# Patient Record
Sex: FEMALE | Race: WHITE | NOT HISPANIC OR LATINO | Employment: UNEMPLOYED | URBAN - METROPOLITAN AREA
[De-identification: names, ages, dates, MRNs, and addresses within clinical notes are randomized per-mention and may not be internally consistent; named-entity substitution may affect disease eponyms.]

---

## 2017-04-12 ENCOUNTER — HISTORICAL (OUTPATIENT)
Dept: LAB | Facility: HOSPITAL | Age: 57
End: 2017-04-12

## 2017-05-08 ENCOUNTER — HISTORICAL (OUTPATIENT)
Dept: RADIOLOGY | Facility: HOSPITAL | Age: 57
End: 2017-05-08

## 2017-07-12 ENCOUNTER — HISTORICAL (OUTPATIENT)
Dept: LAB | Facility: HOSPITAL | Age: 57
End: 2017-07-12

## 2017-07-12 LAB
HEMOCCULT SP1 STL QL: NEGATIVE
HEMOCCULT SP2 STL QL: NEGATIVE

## 2017-11-06 ENCOUNTER — HISTORICAL (OUTPATIENT)
Dept: LAB | Facility: HOSPITAL | Age: 57
End: 2017-11-06

## 2017-11-06 LAB
BUN SERPL-MCNC: 14 MG/DL (ref 7–18)
CALCIUM SERPL-MCNC: 9.2 MG/DL (ref 8.5–10.1)
CHLORIDE SERPL-SCNC: 102 MMOL/L (ref 98–107)
CO2 SERPL-SCNC: 25 MMOL/L (ref 21–32)
CREAT SERPL-MCNC: 0.85 MG/DL (ref 0.55–1.02)
CREAT UR-MCNC: 13 MG/DL
EST. AVERAGE GLUCOSE BLD GHB EST-MCNC: 177 MG/DL
GLUCOSE SERPL-MCNC: 201 MG/DL (ref 74–106)
HBA1C MFR BLD: 7.8 % (ref 4.5–6.2)
MICROALBUMIN UR-MCNC: <0.5 MG/DL
MICROALBUMIN/CREAT RATIO PNL UR: <38.5 MG/GM CR (ref 0–30)
POTASSIUM SERPL-SCNC: 3.6 MMOL/L (ref 3.5–5.1)
SODIUM SERPL-SCNC: 139 MMOL/L (ref 136–145)

## 2018-02-23 ENCOUNTER — HISTORICAL (OUTPATIENT)
Dept: LAB | Facility: HOSPITAL | Age: 58
End: 2018-02-23

## 2018-02-23 LAB
EST. AVERAGE GLUCOSE BLD GHB EST-MCNC: 134 MG/DL
HBA1C MFR BLD: 6.3 % (ref 4.5–6.2)

## 2018-07-18 ENCOUNTER — HISTORICAL (OUTPATIENT)
Dept: RADIOLOGY | Facility: HOSPITAL | Age: 58
End: 2018-07-18

## 2018-07-18 LAB
APPEARANCE, UA: CLEAR
BACTERIA SPEC CULT: NORMAL
BILIRUB UR QL STRIP: NEGATIVE
CHOLEST SERPL-MCNC: 220 MG/DL (ref 50–200)
CHOLEST/HDLC SERPL: 5 {RATIO} (ref 0–5)
COLOR UR: YELLOW
CREAT UR-MCNC: 97 MG/DL
GLUCOSE (UA): NEGATIVE
HDLC SERPL-MCNC: 48 MG/DL (ref 35–60)
HGB UR QL STRIP: NEGATIVE
KETONES UR QL STRIP: NEGATIVE
LDLC SERPL CALC-MCNC: 127 MG/DL (ref 50–140)
LEUKOCYTE ESTERASE UR QL STRIP: NEGATIVE
MICROALBUMIN UR-MCNC: 0.8 MG/DL
MICROALBUMIN/CREAT RATIO PNL UR: 8.2 MG/GM CR (ref 0–30)
NITRITE UR QL STRIP: NEGATIVE
PH UR STRIP: 5.5 [PH] (ref 4.6–8)
PROT UR QL STRIP: NEGATIVE
RBC #/AREA URNS HPF: NORMAL /HPF
SP GR UR STRIP: 1.02 (ref 1–1.03)
SQUAMOUS EPITHELIAL, UA: NORMAL
TRIGL SERPL-MCNC: 223 MG/DL (ref 30–150)
UROBILINOGEN UR STRIP-ACNC: 0.2
VLDLC SERPL CALC-MCNC: 45 MG/DL
WBC #/AREA URNS HPF: NORMAL /HPF

## 2018-11-28 ENCOUNTER — HISTORICAL (OUTPATIENT)
Dept: LAB | Facility: HOSPITAL | Age: 58
End: 2018-11-28

## 2018-11-28 LAB
BUN SERPL-MCNC: 11 MG/DL (ref 7–18)
CALCIUM SERPL-MCNC: 8.6 MG/DL (ref 8.5–10.1)
CHLORIDE SERPL-SCNC: 108 MMOL/L (ref 98–107)
CO2 SERPL-SCNC: 23.9 MMOL/L (ref 21–32)
CREAT SERPL-MCNC: 0.74 MG/DL (ref 0.55–1.02)
CREAT/UREA NIT SERPL: 15
GLUCOSE SERPL-MCNC: 155 MG/DL (ref 74–106)
POTASSIUM SERPL-SCNC: 4 MMOL/L (ref 3.5–5.1)
SODIUM SERPL-SCNC: 141 MMOL/L (ref 136–145)

## 2018-12-03 ENCOUNTER — HISTORICAL (OUTPATIENT)
Dept: LAB | Facility: HOSPITAL | Age: 58
End: 2018-12-03

## 2018-12-03 LAB
HEMOCCULT SP1 STL QL: NEGATIVE
HEMOCCULT SP2 STL QL: NEGATIVE

## 2019-06-26 ENCOUNTER — HISTORICAL (OUTPATIENT)
Dept: LAB | Facility: HOSPITAL | Age: 59
End: 2019-06-26

## 2019-06-26 LAB
ABS NEUT (OLG): 5.11
ALBUMIN SERPL-MCNC: 3.7 GM/DL (ref 3.4–5)
ALBUMIN/GLOB SERPL: 1 RATIO (ref 1.1–2)
ALP SERPL-CCNC: 74 UNIT/L (ref 46–116)
ALT SERPL-CCNC: 28 UNIT/L (ref 12–78)
APPEARANCE, UA: CLEAR
AST SERPL-CCNC: 9 UNIT/L (ref 10–37)
BACTERIA SPEC CULT: ABNORMAL
BASOPHILS # BLD AUTO: 0.04 X10(3)/MCL
BASOPHILS NFR BLD AUTO: 0.4 %
BILIRUB SERPL-MCNC: 0.2 MG/DL (ref 0.2–1)
BILIRUB UR QL STRIP: NEGATIVE
BILIRUBIN DIRECT+TOT PNL SERPL-MCNC: 0.07 MG/DL (ref 0–0.2)
BILIRUBIN DIRECT+TOT PNL SERPL-MCNC: 0.13 MG/DL
BUN SERPL-MCNC: 16 MG/DL (ref 7–18)
CALCIUM SERPL-MCNC: 9.3 MG/DL (ref 8.5–10.1)
CHLORIDE SERPL-SCNC: 106 MMOL/L (ref 98–107)
CHOLEST SERPL-MCNC: 352 MG/DL (ref 50–200)
CHOLEST/HDLC SERPL: 9 {RATIO} (ref 0–5)
CO2 SERPL-SCNC: 23.8 MMOL/L (ref 21–32)
COLOR UR: YELLOW
CREAT SERPL-MCNC: 0.78 MG/DL (ref 0.55–1.02)
CREAT UR-MCNC: 133 MG/DL
EOSINOPHIL # BLD AUTO: 0.28 X10(3)/MCL
EOSINOPHIL NFR BLD AUTO: 3.1 %
ERYTHROCYTE [DISTWIDTH] IN BLOOD BY AUTOMATED COUNT: 13 %
EST. AVERAGE GLUCOSE BLD GHB EST-MCNC: 186 MG/DL
GLOBULIN SER-MCNC: 3.7 GM/DL (ref 2.4–3.5)
GLUCOSE (UA): NEGATIVE
GLUCOSE SERPL-MCNC: 182 MG/DL (ref 74–106)
HBA1C MFR BLD: 8.1 % (ref 4.5–6.2)
HCT VFR BLD AUTO: 36 % (ref 34–46)
HDLC SERPL-MCNC: 41 MG/DL (ref 35–60)
HGB BLD-MCNC: 11.6 GM/DL (ref 11.3–15.4)
HGB UR QL STRIP: NEGATIVE
IMM GRANULOCYTES # BLD AUTO: 0.03 10*3/UL (ref 0–0.1)
IMM GRANULOCYTES NFR BLD AUTO: 0.3 % (ref 0–1)
KETONES UR QL STRIP: NEGATIVE
LDLC SERPL CALC-MCNC: ABNORMAL MG/DL (ref 50–140)
LEUKOCYTE ESTERASE UR QL STRIP: ABNORMAL
LYMPHOCYTES # BLD AUTO: 3.06 X10(3)/MCL
LYMPHOCYTES NFR BLD AUTO: 33.4 %
MCH RBC QN AUTO: 28.6 PG (ref 27–33)
MCHC RBC AUTO-ENTMCNC: 32.2 GM/DL (ref 32–35)
MCV RBC AUTO: 88.7 FL (ref 81–97)
MICROALBUMIN UR-MCNC: 1.8 MG/DL
MICROALBUMIN/CREAT RATIO PNL UR: 13.5 MG/GM CR (ref 0–30)
MONOCYTES # BLD AUTO: 0.65 X10(3)/MCL
MONOCYTES NFR BLD AUTO: 7.1 %
NEUTROPHILS # BLD AUTO: 5.11 X10(3)/MCL
NEUTROPHILS NFR BLD AUTO: 55.7 %
NITRITE UR QL STRIP: NEGATIVE
PH UR STRIP: 5.5 [PH] (ref 4.6–8)
PLATELET # BLD AUTO: 341 X10(3)/MCL (ref 151–368)
PMV BLD AUTO: 10 FL
POTASSIUM SERPL-SCNC: 3.9 MMOL/L (ref 3.5–5.1)
PROT SERPL-MCNC: 7.4 GM/DL (ref 6.4–8.2)
PROT UR QL STRIP: NEGATIVE
RBC # BLD AUTO: 4.06 X10(6)/MCL (ref 3.9–5)
RBC #/AREA URNS HPF: ABNORMAL /[HPF]
SODIUM SERPL-SCNC: 142 MMOL/L (ref 136–145)
SP GR UR STRIP: 1.02 (ref 1–1.03)
SQUAMOUS EPITHELIAL, UA: ABNORMAL
TRIGL SERPL-MCNC: 444 MG/DL (ref 30–150)
UROBILINOGEN UR STRIP-ACNC: 0.2
VLDLC SERPL CALC-MCNC: 89 MG/DL
WBC # SPEC AUTO: 9.17 X10(3)/MCL (ref 3.4–9.2)
WBC #/AREA URNS HPF: ABNORMAL /HPF

## 2019-06-28 LAB — FINAL CULTURE: NO GROWTH

## 2019-09-13 ENCOUNTER — HISTORICAL (OUTPATIENT)
Dept: LAB | Facility: HOSPITAL | Age: 59
End: 2019-09-13

## 2019-09-13 LAB
CHOLEST SERPL-MCNC: 323 MG/DL (ref 50–200)
CHOLEST/HDLC SERPL: 7 {RATIO} (ref 0–5)
EST. AVERAGE GLUCOSE BLD GHB EST-MCNC: 166 MG/DL
HBA1C MFR BLD: 7.4 % (ref 4.5–6.2)
HDLC SERPL-MCNC: 44 MG/DL (ref 35–60)
LDLC SERPL CALC-MCNC: 212 MG/DL (ref 50–140)
TRIGL SERPL-MCNC: 335 MG/DL (ref 30–150)
VLDLC SERPL CALC-MCNC: 67 MG/DL

## 2019-11-04 ENCOUNTER — HISTORICAL (OUTPATIENT)
Dept: RADIOLOGY | Facility: HOSPITAL | Age: 59
End: 2019-11-04

## 2020-03-01 LAB — HEMOCCULT SP1 STL QL: NEGATIVE

## 2020-03-02 LAB — HEMOCCULT SP2 STL QL: NEGATIVE

## 2020-03-03 ENCOUNTER — HISTORICAL (OUTPATIENT)
Dept: LAB | Facility: HOSPITAL | Age: 60
End: 2020-03-03

## 2020-06-24 ENCOUNTER — HISTORICAL (OUTPATIENT)
Dept: RADIOLOGY | Facility: HOSPITAL | Age: 60
End: 2020-06-24

## 2020-06-24 LAB
ABS NEUT (OLG): 5.25
ALBUMIN SERPL-MCNC: 4.2 GM/DL (ref 3.5–5)
ALBUMIN/GLOB SERPL: 1.2 RATIO (ref 1.1–2)
ALP SERPL-CCNC: 85 UNIT/L (ref 40–150)
ALT SERPL-CCNC: 21 UNIT/L (ref 0–55)
APPEARANCE, UA: ABNORMAL
AST SERPL-CCNC: 12 UNIT/L (ref 5–34)
BACTERIA SPEC CULT: ABNORMAL
BASOPHILS # BLD AUTO: 0.04 X10(3)/MCL
BASOPHILS NFR BLD AUTO: 0.4 %
BILIRUB SERPL-MCNC: 0.3 MG/DL
BILIRUB UR QL STRIP: NEGATIVE
BILIRUBIN DIRECT+TOT PNL SERPL-MCNC: 0.1 MG/DL (ref 0–0.5)
BILIRUBIN DIRECT+TOT PNL SERPL-MCNC: 0.2 MG/DL
BUN SERPL-MCNC: 29 MG/DL (ref 9.8–20.1)
CALCIUM SERPL-MCNC: 9.5 MG/DL (ref 8.4–10.2)
CHLORIDE SERPL-SCNC: 105 MMOL/L (ref 98–107)
CHOLEST SERPL-MCNC: 261 MG/DL
CHOLEST/HDLC SERPL: 6 {RATIO} (ref 0–5)
CO2 SERPL-SCNC: 23 MEQ/L (ref 22–29)
COLOR UR: YELLOW
CREAT SERPL-MCNC: 1 MG/DL (ref 0.55–1.02)
CREAT UR-MCNC: 193 MG/DL (ref 45–106)
EOSINOPHIL # BLD AUTO: 0.36 X10(3)/MCL
EOSINOPHIL NFR BLD AUTO: 3.6 %
ERYTHROCYTE [DISTWIDTH] IN BLOOD BY AUTOMATED COUNT: 13 %
EST. AVERAGE GLUCOSE BLD GHB EST-MCNC: 332 MG/DL
GLOBULIN SER-MCNC: 3.6 GM/DL (ref 2.4–3.5)
GLUCOSE (UA): ABNORMAL
GLUCOSE SERPL-MCNC: 314 MG/DL (ref 74–100)
HBA1C MFR BLD: 13.2 % (ref 4–6)
HCT VFR BLD AUTO: 39.9 % (ref 34–46)
HDLC SERPL-MCNC: 45 MG/DL (ref 35–60)
HGB BLD-MCNC: 13.1 GM/DL (ref 11.3–15.4)
HGB UR QL STRIP: ABNORMAL
IMM GRANULOCYTES # BLD AUTO: 0.02 10*3/UL (ref 0–0.1)
IMM GRANULOCYTES NFR BLD AUTO: 0.2 % (ref 0–1)
KETONES UR QL STRIP: NEGATIVE
LDLC SERPL CALC-MCNC: 139 MG/DL (ref 50–140)
LEUKOCYTE ESTERASE UR QL STRIP: ABNORMAL
LYMPHOCYTES # BLD AUTO: 3.65 X10(3)/MCL
LYMPHOCYTES NFR BLD AUTO: 36.4 %
MCH RBC QN AUTO: 28.5 PG (ref 27–33)
MCHC RBC AUTO-ENTMCNC: 32.8 GM/DL (ref 32–35)
MCV RBC AUTO: 86.7 FL (ref 81–97)
MICROALBUMIN UR-MCNC: 39.6 UG/ML
MICROALBUMIN/CREAT RATIO PNL UR: 20.5 MG/GM CR (ref 0–30)
MONOCYTES # BLD AUTO: 0.7 X10(3)/MCL
MONOCYTES NFR BLD AUTO: 7 %
NEUTROPHILS # BLD AUTO: 5.25 X10(3)/MCL
NEUTROPHILS NFR BLD AUTO: 52.4 %
NITRITE UR QL STRIP: NEGATIVE
PH UR STRIP: 5.5 [PH] (ref 4.6–8)
PLATELET # BLD AUTO: 339 X10(3)/MCL (ref 140–450)
PMV BLD AUTO: 10 FL
POTASSIUM SERPL-SCNC: 4.3 MMOL/L (ref 3.5–5.1)
PROT SERPL-MCNC: 7.8 GM/DL (ref 6.4–8.3)
PROT UR QL STRIP: ABNORMAL
RBC # BLD AUTO: 4.6 X10(6)/MCL (ref 3.9–5)
RBC #/AREA URNS HPF: ABNORMAL /[HPF]
SODIUM SERPL-SCNC: 138 MMOL/L (ref 136–145)
SP GR UR STRIP: 1.02 (ref 1–1.03)
SQUAMOUS EPITHELIAL, UA: ABNORMAL
TRIGL SERPL-MCNC: 383 MG/DL (ref 37–140)
UROBILINOGEN UR STRIP-ACNC: 0.2
VLDLC SERPL CALC-MCNC: 77 MG/DL
WBC # SPEC AUTO: 10.02 X10(3)/MCL (ref 3.4–9.2)
WBC #/AREA URNS HPF: ABNORMAL /HPF

## 2020-06-25 LAB — FINAL CULTURE: NORMAL

## 2020-11-19 ENCOUNTER — HISTORICAL (OUTPATIENT)
Dept: LAB | Facility: HOSPITAL | Age: 60
End: 2020-11-19

## 2020-11-19 LAB
EST. AVERAGE GLUCOSE BLD GHB EST-MCNC: 352 MG/DL
HBA1C MFR BLD: 13.9 % (ref 4–6)

## 2021-07-01 ENCOUNTER — HISTORICAL (OUTPATIENT)
Dept: RADIOLOGY | Facility: HOSPITAL | Age: 61
End: 2021-07-01

## 2021-07-19 ENCOUNTER — HISTORICAL (OUTPATIENT)
Dept: LAB | Facility: HOSPITAL | Age: 61
End: 2021-07-19

## 2021-07-19 LAB
ABS NEUT (OLG): 6.88
ALBUMIN SERPL-MCNC: 4.3 GM/DL (ref 3.5–5)
ALBUMIN/GLOB SERPL: 1.5 RATIO (ref 1.1–2)
ALP SERPL-CCNC: 45 UNIT/L (ref 40–150)
ALT SERPL-CCNC: 33 UNIT/L (ref 0–55)
AST SERPL-CCNC: 19 UNIT/L (ref 5–34)
BASOPHILS # BLD AUTO: 0.03 X10(3)/MCL
BASOPHILS NFR BLD AUTO: 0.3 %
BILIRUB SERPL-MCNC: 0.5 MG/DL
BILIRUBIN DIRECT+TOT PNL SERPL-MCNC: 0.2 MG/DL (ref 0–0.5)
BILIRUBIN DIRECT+TOT PNL SERPL-MCNC: 0.3 MG/DL
BUN SERPL-MCNC: 32 MG/DL (ref 9.8–20.1)
CALCIUM SERPL-MCNC: 10 MG/DL (ref 8.4–10.2)
CHLORIDE SERPL-SCNC: 105 MMOL/L (ref 98–107)
CHOLEST SERPL-MCNC: 131 MG/DL
CHOLEST/HDLC SERPL: 2 {RATIO} (ref 0–5)
CO2 SERPL-SCNC: 24 MEQ/L (ref 22–29)
CREAT SERPL-MCNC: 0.77 MG/DL (ref 0.55–1.02)
CREAT UR-MCNC: 87 MG/DL (ref 45–106)
EOSINOPHIL # BLD AUTO: 0.04 X10(3)/MCL
EOSINOPHIL NFR BLD AUTO: 0.4 %
ERYTHROCYTE [DISTWIDTH] IN BLOOD BY AUTOMATED COUNT: 14 %
GLOBULIN SER-MCNC: 2.9 GM/DL (ref 2.4–3.5)
GLUCOSE SERPL-MCNC: 99 MG/DL (ref 74–100)
HCT VFR BLD AUTO: 36.9 % (ref 34–46)
HDLC SERPL-MCNC: 53 MG/DL (ref 35–60)
HGB BLD-MCNC: 11.8 GM/DL (ref 11.3–15.4)
IMM GRANULOCYTES # BLD AUTO: 0.02 10*3/UL (ref 0–0.1)
IMM GRANULOCYTES NFR BLD AUTO: 0.2 % (ref 0–1)
LDLC SERPL CALC-MCNC: 48 MG/DL (ref 50–140)
LYMPHOCYTES # BLD AUTO: 3.31 X10(3)/MCL
LYMPHOCYTES NFR BLD AUTO: 30.4 %
MCH RBC QN AUTO: 29 PG (ref 27–33)
MCHC RBC AUTO-ENTMCNC: 32 GM/DL (ref 32–35)
MCV RBC AUTO: 90.7 FL (ref 81–97)
MICROALBUMIN UR-MCNC: 42.3 UG/ML
MICROALBUMIN/CREAT RATIO PNL UR: 48.6 MG/GM CR (ref 0–30)
MONOCYTES # BLD AUTO: 0.62 X10(3)/MCL
MONOCYTES NFR BLD AUTO: 5.7 %
NEUTROPHILS # BLD AUTO: 6.88 X10(3)/MCL
NEUTROPHILS NFR BLD AUTO: 63 %
PLATELET # BLD AUTO: 462 X10(3)/MCL (ref 140–450)
PMV BLD AUTO: 10 FL
POTASSIUM SERPL-SCNC: 3.8 MMOL/L (ref 3.5–5.1)
PROT SERPL-MCNC: 7.2 GM/DL (ref 6.4–8.3)
RBC # BLD AUTO: 4.07 X10(6)/MCL (ref 3.9–5)
SODIUM SERPL-SCNC: 140 MMOL/L (ref 136–145)
TRIGL SERPL-MCNC: 150 MG/DL (ref 37–140)
VLDLC SERPL CALC-MCNC: 30 MG/DL
WBC # SPEC AUTO: 10.9 X10(3)/MCL (ref 3.4–9.2)

## 2021-07-27 LAB — HEMOCCULT SP1 STL QL: NEGATIVE

## 2021-07-28 ENCOUNTER — HISTORICAL (OUTPATIENT)
Dept: ADMINISTRATIVE | Facility: HOSPITAL | Age: 61
End: 2021-07-28

## 2021-07-28 LAB — HEMOCCULT SP2 STL QL: NEGATIVE

## 2021-08-02 ENCOUNTER — HISTORICAL (OUTPATIENT)
Dept: RADIOLOGY | Facility: HOSPITAL | Age: 61
End: 2021-08-02

## 2022-04-01 LAB
HBA1C MFR BLD: 5.7 % (ref 4–6)
HBA1C MFR BLD: 5.7 % (ref 4–6)

## 2022-04-10 ENCOUNTER — HISTORICAL (OUTPATIENT)
Dept: ADMINISTRATIVE | Facility: HOSPITAL | Age: 62
End: 2022-04-10

## 2022-04-28 VITALS
WEIGHT: 76.5 LBS | OXYGEN SATURATION: 96 % | HEIGHT: 56 IN | SYSTOLIC BLOOD PRESSURE: 98 MMHG | BODY MASS INDEX: 17.21 KG/M2 | DIASTOLIC BLOOD PRESSURE: 60 MMHG

## 2022-07-20 ENCOUNTER — DOCUMENTATION ONLY (OUTPATIENT)
Dept: FAMILY MEDICINE | Facility: CLINIC | Age: 62
End: 2022-07-20

## 2022-08-09 ENCOUNTER — DOCUMENTATION ONLY (OUTPATIENT)
Dept: ADMINISTRATIVE | Facility: HOSPITAL | Age: 62
End: 2022-08-09

## 2022-08-11 DIAGNOSIS — Z11.59 NEED FOR HEPATITIS C SCREENING TEST: ICD-10-CM

## 2022-08-11 DIAGNOSIS — Z13.6 SCREENING FOR ISCHEMIC HEART DISEASE: ICD-10-CM

## 2022-08-11 DIAGNOSIS — E13.9 DIABETES 1.5, MANAGED AS TYPE 2: ICD-10-CM

## 2022-08-11 DIAGNOSIS — Z11.4 ENCOUNTER FOR SCREENING FOR HIV: ICD-10-CM

## 2022-08-11 DIAGNOSIS — Z00.00 WELLNESS EXAMINATION: Primary | ICD-10-CM

## 2022-09-08 ENCOUNTER — OFFICE VISIT (OUTPATIENT)
Dept: FAMILY MEDICINE | Facility: CLINIC | Age: 62
End: 2022-09-08
Payer: MEDICARE

## 2022-09-08 VITALS
WEIGHT: 87.81 LBS | TEMPERATURE: 97 F | SYSTOLIC BLOOD PRESSURE: 98 MMHG | DIASTOLIC BLOOD PRESSURE: 62 MMHG | BODY MASS INDEX: 20.32 KG/M2 | OXYGEN SATURATION: 99 % | HEART RATE: 70 BPM | HEIGHT: 55 IN

## 2022-09-08 DIAGNOSIS — Z12.11 COLON CANCER SCREENING: ICD-10-CM

## 2022-09-08 DIAGNOSIS — Z12.31 SCREENING MAMMOGRAM, ENCOUNTER FOR: ICD-10-CM

## 2022-09-08 DIAGNOSIS — Z01.419 ENCOUNTER FOR ANNUAL ROUTINE GYNECOLOGICAL EXAMINATION: ICD-10-CM

## 2022-09-08 DIAGNOSIS — Z00.00 WELLNESS EXAMINATION: Primary | ICD-10-CM

## 2022-09-08 PROBLEM — F32.A DEPRESSION: Status: ACTIVE | Noted: 2022-09-08

## 2022-09-08 PROBLEM — I10 HYPERTENSION: Status: ACTIVE | Noted: 2022-09-08

## 2022-09-08 PROBLEM — E78.5 HYPERLIPIDEMIA: Status: ACTIVE | Noted: 2022-09-08

## 2022-09-08 PROBLEM — E11.9 DIABETES MELLITUS: Status: ACTIVE | Noted: 2022-09-08

## 2022-09-08 PROCEDURE — G0439 PPPS, SUBSEQ VISIT: HCPCS | Mod: ,,, | Performed by: FAMILY MEDICINE

## 2022-09-08 PROCEDURE — G0439 PR MEDICARE ANNUAL WELLNESS SUBSEQUENT VISIT: ICD-10-PCS | Mod: ,,, | Performed by: FAMILY MEDICINE

## 2022-09-08 RX ORDER — PIOGLITAZONEHYDROCHLORIDE 30 MG/1
30 TABLET ORAL DAILY
COMMUNITY
Start: 2022-08-19

## 2022-09-08 RX ORDER — METFORMIN HYDROCHLORIDE 1000 MG/1
1000 TABLET ORAL 2 TIMES DAILY
COMMUNITY
Start: 2022-07-30

## 2022-09-08 RX ORDER — ROSUVASTATIN CALCIUM 40 MG/1
40 TABLET, COATED ORAL DAILY
COMMUNITY
Start: 2022-08-19

## 2022-09-08 RX ORDER — QUINAPRIL HYDROCHLORIDE AND HYDROCHLOROTHIAZIDE 20; 12.5 MG/1; MG/1
1 TABLET, FILM COATED ORAL DAILY
COMMUNITY
Start: 2022-08-19 | End: 2022-11-03 | Stop reason: SDUPTHER

## 2022-09-08 NOTE — PROGRESS NOTES
"  Patient ID: [id number]     Chief Complaint: Annual Exam      HPI:     Lucretia Bean is a 62 y.o. female here today for a Medicare Wellness.       Opioid Screening: Patient medication list reviewed, patient is not taking prescription opioids. Patient is not using additional opioids than prescribed. Patient is not at low risk of substance abuse based on this opioid use history.       ----------------------------  Depression  Essential (primary) hypertension  Mixed hyperlipidemia  Type 2 diabetes mellitus without complications     History reviewed. No pertinent surgical history.    Review of patient's allergies indicates:  No Known Allergies    No outpatient medications have been marked as taking for the 9/8/22 encounter (Office Visit) with Herb Delgado MD.       Social History     Socioeconomic History    Marital status: Single   Tobacco Use    Smoking status: Never    Smokeless tobacco: Never   Substance and Sexual Activity    Alcohol use: Never    Drug use: Never    Sexual activity: Not Currently        History reviewed. No pertinent family history.     Patient Care Team:  Herb Delgado MD as PCP - General (Family Medicine)  Cyril Gabriel MD as Consulting Physician (Endocrinology)       Subjective:     Diabetes  - tolerating medication (no side-effects)  - DXT: 's  - watching diet    Review of Systems   Constitutional: Negative.    HENT: Negative.     Respiratory: Negative.     Cardiovascular: Negative.    Gastrointestinal: Negative.    Genitourinary: Negative.    Musculoskeletal: Negative.    Neurological: Negative.    Endo/Heme/Allergies: Negative.    Psychiatric/Behavioral: Negative.         Patient Reported Health Risk Assessment       Objective:     BP 98/62 (BP Location: Left arm)   Pulse 70   Temp 97.4 °F (36.3 °C)   Ht 4' 7" (1.397 m)   Wt 39.8 kg (87 lb 12.8 oz)   SpO2 99%   BMI 20.41 kg/m²     Physical Exam  Constitutional:       Appearance: Normal appearance.   Cardiovascular:      " Rate and Rhythm: Normal rate and regular rhythm.      Heart sounds: Normal heart sounds.   Pulmonary:      Effort: Pulmonary effort is normal.      Breath sounds: Normal breath sounds.   Abdominal:      General: Abdomen is flat. Bowel sounds are normal.      Palpations: Abdomen is soft.   Neurological:      Mental Status: She is alert.   Psychiatric:         Mood and Affect: Mood normal.         Behavior: Behavior normal.         Thought Content: Thought content normal.         Judgment: Judgment normal.         No flowsheet data found.  Fall Risk Assessment - Outpatient 9/8/2022   Mobility Status Ambulatory   Number of falls 0   Identified as fall risk 0              Assessment/Plan:     1. Wellness examination  Lab work results pending; get copy of recent lab work at [facility]  Continue current medication  Continue diet/exercise  Advanced directive discussed with patient  Return to clinic with any concerns    2. Screening mammogram, encounter for  Schedule mammogram  3. Colon cancer screening  Refer patient to Dr. Cantor for colonoscopy  4. Encounter for annual routine gynecological examination   Refer patient to Dr. Mcclure for annual Pap      Medicare Annual Wellness and Personalized Prevention Plan:   Fall Risk + Home Safety + Hearing Impairment + Depression Screen + Opioid and Substance Abuse Screening + Cognitive Impairment Screen + Health Risk Assessment all reviewed.     Health Maintenance Topics with due status: Not Due       Topic Last Completion Date    Lipid Panel 07/19/2021      The patient's Health Maintenance was reviewed and the following appears to be due at this time:   Health Maintenance Due   Topic Date Due    Hepatitis C Screening  Never done    Cervical Cancer Screening  Never done    HIV Screening  Never done    TETANUS VACCINE  Never done    Colorectal Cancer Screening  Never done    Shingles Vaccine (1 of 2) Never done    COVID-19 Vaccine (3 - Booster for Pfizer series) 02/08/2022    Mammogram   07/01/2022    Influenza Vaccine (1) 09/01/2022       Advance Care Planning   I attest to discussing Advance Care Planning with patient and/or family member.  Education was provided including the importance of the Health Care Power of , Advance Directives, and/or [place] documentation.  The patient expressed understanding to the importance of this information and discussion.         Follow up in about 6 months (around 3/8/2023). In addition to their scheduled follow up, the patient has also been instructed to follow up on as needed basis.

## 2022-09-14 ENCOUNTER — TELEPHONE (OUTPATIENT)
Dept: FAMILY MEDICINE | Facility: CLINIC | Age: 62
End: 2022-09-14
Payer: MEDICARE

## 2022-09-14 NOTE — TELEPHONE ENCOUNTER
----- Message from Herb Delgado MD sent at 9/13/2022 10:38 AM CDT -----  Please notify patient with results

## 2022-09-30 LAB
HPV16+18+H RISK 12 DNA CVX-IMP: NEGATIVE
PAP RECOMMENDATION EXT: NORMAL
PAP SMEAR: NORMAL

## 2022-11-03 DIAGNOSIS — I10 PRIMARY HYPERTENSION: Primary | ICD-10-CM

## 2022-11-03 RX ORDER — QUINAPRIL HYDROCHLORIDE AND HYDROCHLOROTHIAZIDE 20; 12.5 MG/1; MG/1
1 TABLET, FILM COATED ORAL DAILY
Qty: 90 TABLET | Refills: 1 | Status: SHIPPED | OUTPATIENT
Start: 2022-11-03 | End: 2023-04-03

## 2022-11-28 LAB — CRC RECOMMENDATION EXT: NORMAL

## 2022-12-05 DIAGNOSIS — I10 HYPERTENSION, UNSPECIFIED TYPE: Primary | ICD-10-CM

## 2022-12-05 RX ORDER — LISINOPRIL AND HYDROCHLOROTHIAZIDE 12.5; 2 MG/1; MG/1
1 TABLET ORAL DAILY
Qty: 90 TABLET | Refills: 3 | Status: SHIPPED | OUTPATIENT
Start: 2022-12-05 | End: 2023-12-04

## 2022-12-07 ENCOUNTER — DOCUMENTATION ONLY (OUTPATIENT)
Dept: FAMILY MEDICINE | Facility: CLINIC | Age: 62
End: 2022-12-07
Payer: MEDICARE

## 2023-04-03 ENCOUNTER — OFFICE VISIT (OUTPATIENT)
Dept: FAMILY MEDICINE | Facility: CLINIC | Age: 63
End: 2023-04-03
Payer: MEDICARE

## 2023-04-03 VITALS
SYSTOLIC BLOOD PRESSURE: 92 MMHG | TEMPERATURE: 97 F | RESPIRATION RATE: 16 BRPM | WEIGHT: 88.38 LBS | DIASTOLIC BLOOD PRESSURE: 60 MMHG | HEIGHT: 55 IN | HEART RATE: 63 BPM | OXYGEN SATURATION: 100 % | BODY MASS INDEX: 20.45 KG/M2

## 2023-04-03 DIAGNOSIS — I10 HYPERTENSION, UNSPECIFIED TYPE: Primary | ICD-10-CM

## 2023-04-03 DIAGNOSIS — E11.42 TYPE 2 DIABETES MELLITUS WITH DIABETIC POLYNEUROPATHY, WITHOUT LONG-TERM CURRENT USE OF INSULIN: ICD-10-CM

## 2023-04-03 DIAGNOSIS — E78.5 HYPERLIPIDEMIA, UNSPECIFIED HYPERLIPIDEMIA TYPE: ICD-10-CM

## 2023-04-03 PROCEDURE — 99214 PR OFFICE/OUTPT VISIT, EST, LEVL IV, 30-39 MIN: ICD-10-PCS | Mod: ,,, | Performed by: FAMILY MEDICINE

## 2023-04-03 PROCEDURE — 99214 OFFICE O/P EST MOD 30 MIN: CPT | Mod: ,,, | Performed by: FAMILY MEDICINE

## 2023-04-03 NOTE — PROGRESS NOTES
"Subjective:       Patient ID: Lucretia Bean is a 62 y.o. female.    Chief Complaint: 6 month follow up and hyperlipidemia per labs from Dr. Gabriel       Routine    Diabetes  - tolerating medication (no side-effects)  - DXT: 's  - watching diet  - patient reports no neuropathy at this time    Hypertension  - tolerating medication (no side effects)  - no headaches  - patient without any complaints    Hyperlipidemia  - tolerating medication (no side effects)   - no myalgia  - watching diet    Review of Systems   Constitutional: Negative.    Respiratory: Negative.     Cardiovascular: Negative.    Gastrointestinal: Negative.    Psychiatric/Behavioral: Negative.           Objective:      BP 92/60 (BP Location: Left arm, Patient Position: Sitting, BP Method: Large (Manual))   Pulse 63   Temp 96.9 °F (36.1 °C)   Resp 16   Ht 4' 7" (1.397 m)   Wt 40.1 kg (88 lb 6.4 oz)   SpO2 100%   BMI 20.55 kg/m²    Physical Exam  Constitutional:       Appearance: Normal appearance.   Cardiovascular:      Rate and Rhythm: Normal rate and regular rhythm.      Heart sounds: Normal heart sounds.   Pulmonary:      Effort: Pulmonary effort is normal.      Breath sounds: Normal breath sounds.   Neurological:      Mental Status: She is alert.   Psychiatric:         Mood and Affect: Mood normal.         Behavior: Behavior normal.         Thought Content: Thought content normal.         Judgment: Judgment normal.             Assessment:       Problem List Items Addressed This Visit          Cardiac/Vascular    Hyperlipidemia    Hypertension - Primary       Endocrine    Type 2 diabetes mellitus with diabetic polyneuropathy, without long-term current use of insulin    Current Assessment & Plan     Hemoglobin A1c controlled   Continue current medication               Plan:   1. Hypertension, unspecified type  Continue current Rx medication  Monitor BP  Return to clinic with any concerns    2. Type 2 diabetes mellitus with diabetic " polyneuropathy, without long-term current use of insulin  Assessment & Plan:  Hemoglobin A1c controlled   Continue current medication  Lab work at next visit    3. Hyperlipidemia, unspecified hyperlipidemia type  Continue current medication   FLP prior to next visit

## 2023-09-11 ENCOUNTER — DOCUMENTATION ONLY (OUTPATIENT)
Dept: ADMINISTRATIVE | Facility: HOSPITAL | Age: 63
End: 2023-09-11
Payer: MEDICARE

## 2023-09-20 DIAGNOSIS — Z11.59 NEED FOR HEPATITIS C SCREENING TEST: ICD-10-CM

## 2023-09-20 DIAGNOSIS — Z00.00 WELLNESS EXAMINATION: ICD-10-CM

## 2023-09-20 DIAGNOSIS — E11.9 TYPE 2 DIABETES MELLITUS WITHOUT COMPLICATION, WITHOUT LONG-TERM CURRENT USE OF INSULIN: ICD-10-CM

## 2023-09-20 DIAGNOSIS — Z13.6 SCREENING FOR ISCHEMIC HEART DISEASE: ICD-10-CM

## 2023-09-20 DIAGNOSIS — I10 HYPERTENSION, UNSPECIFIED TYPE: ICD-10-CM

## 2023-09-20 DIAGNOSIS — Z11.4 ENCOUNTER FOR HIV (HUMAN IMMUNODEFICIENCY VIRUS) TEST: Primary | ICD-10-CM

## 2023-09-29 ENCOUNTER — DOCUMENTATION ONLY (OUTPATIENT)
Dept: FAMILY MEDICINE | Facility: CLINIC | Age: 63
End: 2023-09-29
Payer: MEDICARE

## 2023-09-29 ENCOUNTER — LAB VISIT (OUTPATIENT)
Dept: LAB | Facility: HOSPITAL | Age: 63
End: 2023-09-29
Attending: FAMILY MEDICINE
Payer: MEDICARE

## 2023-09-29 ENCOUNTER — DOCUMENTATION ONLY (OUTPATIENT)
Dept: ADMINISTRATIVE | Facility: HOSPITAL | Age: 63
End: 2023-09-29
Payer: MEDICARE

## 2023-09-29 DIAGNOSIS — Z11.4 ENCOUNTER FOR HIV (HUMAN IMMUNODEFICIENCY VIRUS) TEST: ICD-10-CM

## 2023-09-29 DIAGNOSIS — Z13.6 SCREENING FOR ISCHEMIC HEART DISEASE: ICD-10-CM

## 2023-09-29 DIAGNOSIS — Z00.00 WELLNESS EXAMINATION: ICD-10-CM

## 2023-09-29 DIAGNOSIS — I10 HYPERTENSION, UNSPECIFIED TYPE: ICD-10-CM

## 2023-09-29 DIAGNOSIS — E11.9 TYPE 2 DIABETES MELLITUS WITHOUT COMPLICATION, WITHOUT LONG-TERM CURRENT USE OF INSULIN: ICD-10-CM

## 2023-09-29 DIAGNOSIS — Z11.59 NEED FOR HEPATITIS C SCREENING TEST: ICD-10-CM

## 2023-09-29 LAB
ALBUMIN SERPL-MCNC: 4.1 G/DL (ref 3.4–4.8)
ALBUMIN/GLOB SERPL: 1.4 RATIO (ref 1.1–2)
ALP SERPL-CCNC: 47 UNIT/L (ref 40–150)
ALT SERPL-CCNC: 20 UNIT/L (ref 0–55)
AST SERPL-CCNC: 16 UNIT/L (ref 5–34)
BASOPHILS # BLD AUTO: 0.04 X10(3)/MCL
BASOPHILS NFR BLD AUTO: 0.4 %
BILIRUB SERPL-MCNC: 0.2 MG/DL
BUN SERPL-MCNC: 30.1 MG/DL (ref 9.8–20.1)
CALCIUM SERPL-MCNC: 10.2 MG/DL (ref 8.4–10.2)
CHLORIDE SERPL-SCNC: 107 MMOL/L (ref 98–107)
CHOLEST SERPL-MCNC: 150 MG/DL
CHOLEST/HDLC SERPL: 2 {RATIO} (ref 0–5)
CO2 SERPL-SCNC: 26 MMOL/L (ref 23–31)
CREAT SERPL-MCNC: 0.82 MG/DL (ref 0.55–1.02)
CREAT UR-MCNC: 75.4 MG/DL (ref 45–106)
EOSINOPHIL # BLD AUTO: 0.09 X10(3)/MCL (ref 0–0.9)
EOSINOPHIL NFR BLD AUTO: 0.8 %
ERYTHROCYTE [DISTWIDTH] IN BLOOD BY AUTOMATED COUNT: 14.5 % (ref 11.5–17)
EST. AVERAGE GLUCOSE BLD GHB EST-MCNC: 119.8 MG/DL
GFR SERPLBLD CREATININE-BSD FMLA CKD-EPI: >60 MLS/MIN/1.73/M2
GLOBULIN SER-MCNC: 3 GM/DL (ref 2.4–3.5)
GLUCOSE SERPL-MCNC: 109 MG/DL (ref 82–115)
HBA1C MFR BLD: 5.8 %
HCT VFR BLD AUTO: 32.7 % (ref 37–47)
HDLC SERPL-MCNC: 62 MG/DL (ref 35–60)
HGB BLD-MCNC: 9.9 G/DL (ref 12–16)
HIV 1+2 AB+HIV1 P24 AG SERPL QL IA: NONREACTIVE
IMM GRANULOCYTES # BLD AUTO: 0.07 X10(3)/MCL (ref 0–0.04)
IMM GRANULOCYTES NFR BLD AUTO: 0.6 %
LDLC SERPL CALC-MCNC: 62 MG/DL (ref 50–140)
LYMPHOCYTES # BLD AUTO: 2.47 X10(3)/MCL (ref 0.6–4.6)
LYMPHOCYTES NFR BLD AUTO: 22.1 %
MCH RBC QN AUTO: 28.7 PG (ref 27–31)
MCHC RBC AUTO-ENTMCNC: 30.3 G/DL (ref 33–36)
MCV RBC AUTO: 94.8 FL (ref 80–94)
MICROALBUMIN UR-MCNC: 22.5 UG/ML
MICROALBUMIN/CREAT RATIO PNL UR: 29.8 MG/GM CR (ref 0–30)
MONOCYTES # BLD AUTO: 0.59 X10(3)/MCL (ref 0.1–1.3)
MONOCYTES NFR BLD AUTO: 5.3 %
NEUTROPHILS # BLD AUTO: 7.93 X10(3)/MCL (ref 2.1–9.2)
NEUTROPHILS NFR BLD AUTO: 70.8 %
NRBC BLD AUTO-RTO: 0 %
PAP RECOMMENDATION EXT: NORMAL
PLATELET # BLD AUTO: 413 X10(3)/MCL (ref 130–400)
PMV BLD AUTO: 9.4 FL (ref 7.4–10.4)
POTASSIUM SERPL-SCNC: 4.7 MMOL/L (ref 3.5–5.1)
PROT SERPL-MCNC: 7.1 GM/DL (ref 5.8–7.6)
RBC # BLD AUTO: 3.45 X10(6)/MCL (ref 4.2–5.4)
SODIUM SERPL-SCNC: 143 MMOL/L (ref 136–145)
TRIGL SERPL-MCNC: 128 MG/DL (ref 37–140)
VLDLC SERPL CALC-MCNC: 26 MG/DL
WBC # SPEC AUTO: 11.19 X10(3)/MCL (ref 4.5–11.5)

## 2023-09-29 PROCEDURE — 86803 HEPATITIS C AB TEST: CPT

## 2023-09-29 PROCEDURE — 80053 COMPREHEN METABOLIC PANEL: CPT

## 2023-09-29 PROCEDURE — 83036 HEMOGLOBIN GLYCOSYLATED A1C: CPT

## 2023-09-29 PROCEDURE — 80061 LIPID PANEL: CPT

## 2023-09-29 PROCEDURE — 82043 UR ALBUMIN QUANTITATIVE: CPT

## 2023-09-29 PROCEDURE — 87389 HIV-1 AG W/HIV-1&-2 AB AG IA: CPT

## 2023-09-29 PROCEDURE — 85025 COMPLETE CBC W/AUTO DIFF WBC: CPT

## 2023-09-29 PROCEDURE — 36415 COLL VENOUS BLD VENIPUNCTURE: CPT

## 2023-09-30 LAB — HCV AB SERPL QL IA: NONREACTIVE

## 2023-10-02 ENCOUNTER — OFFICE VISIT (OUTPATIENT)
Dept: FAMILY MEDICINE | Facility: CLINIC | Age: 63
End: 2023-10-02
Payer: MEDICARE

## 2023-10-02 ENCOUNTER — CLINICAL SUPPORT (OUTPATIENT)
Dept: FAMILY MEDICINE | Facility: CLINIC | Age: 63
End: 2023-10-02
Attending: FAMILY MEDICINE
Payer: MEDICARE

## 2023-10-02 VITALS
BODY MASS INDEX: 19.9 KG/M2 | WEIGHT: 86 LBS | DIASTOLIC BLOOD PRESSURE: 66 MMHG | HEIGHT: 55 IN | SYSTOLIC BLOOD PRESSURE: 110 MMHG | TEMPERATURE: 96 F | HEART RATE: 74 BPM | OXYGEN SATURATION: 98 % | RESPIRATION RATE: 18 BRPM

## 2023-10-02 DIAGNOSIS — Z00.00 WELLNESS EXAMINATION: Primary | ICD-10-CM

## 2023-10-02 DIAGNOSIS — Z12.31 SCREENING MAMMOGRAM, ENCOUNTER FOR: ICD-10-CM

## 2023-10-02 DIAGNOSIS — Z01.419 ENCOUNTER FOR ANNUAL ROUTINE GYNECOLOGICAL EXAMINATION: ICD-10-CM

## 2023-10-02 DIAGNOSIS — E11.42 TYPE 2 DIABETES MELLITUS WITH DIABETIC POLYNEUROPATHY, WITHOUT LONG-TERM CURRENT USE OF INSULIN: Primary | ICD-10-CM

## 2023-10-02 DIAGNOSIS — R41.3 MEMORY LOSS: ICD-10-CM

## 2023-10-02 DIAGNOSIS — E11.42 TYPE 2 DIABETES MELLITUS WITH DIABETIC POLYNEUROPATHY, WITHOUT LONG-TERM CURRENT USE OF INSULIN: ICD-10-CM

## 2023-10-02 PROCEDURE — 92228 IMG RTA DETC/MNTR DS PHY/QHP: CPT | Mod: TC,,, | Performed by: FAMILY MEDICINE

## 2023-10-02 PROCEDURE — 92228 PR REMOTE IMAGE RETINA, MONITOR/MANAGE ACTIVE DISEASE: ICD-10-PCS | Mod: TC,,, | Performed by: FAMILY MEDICINE

## 2023-10-02 PROCEDURE — 92228 DIABETIC EYE SCREENING PHOTO: ICD-10-PCS | Mod: 26,,, | Performed by: OPHTHALMOLOGY

## 2023-10-02 PROCEDURE — 92228 IMG RTA DETC/MNTR DS PHY/QHP: CPT | Mod: 26,,, | Performed by: OPHTHALMOLOGY

## 2023-10-02 PROCEDURE — G0439 PR MEDICARE ANNUAL WELLNESS SUBSEQUENT VISIT: ICD-10-PCS | Mod: ,,, | Performed by: FAMILY MEDICINE

## 2023-10-02 PROCEDURE — G0439 PPPS, SUBSEQ VISIT: HCPCS | Mod: ,,, | Performed by: FAMILY MEDICINE

## 2023-10-02 NOTE — PROGRESS NOTES
Patient ID: [id number]     Chief Complaint: WELLNESS      HPI:     Lucretia Bean is a 63 y.o. female here today for a Medicare Wellness.       Opioid Screening: Patient medication list reviewed, patient is not taking prescription opioids. Patient is not using additional opioids than prescribed. Patient is not at low risk of substance abuse based on this opioid use history.       ----------------------------  Depression  Essential (primary) hypertension  Mixed hyperlipidemia  Type 2 diabetes mellitus without complications     Past Surgical History:   Procedure Laterality Date    TUBAL LIGATION         Review of patient's allergies indicates:  No Known Allergies    Outpatient Medications Marked as Taking for the 10/2/23 encounter (Office Visit) with Herb Delgado MD   Medication Sig Dispense Refill    lisinopriL-hydrochlorothiazide (PRINZIDE,ZESTORETIC) 20-12.5 mg per tablet Take 1 tablet by mouth once daily. 90 tablet 3    metFORMIN (GLUCOPHAGE) 1000 MG tablet Take 1,000 mg by mouth 2 (two) times daily.      pioglitazone (ACTOS) 30 MG tablet Take 30 mg by mouth once daily.      rosuvastatin (CRESTOR) 40 MG Tab Take 40 mg by mouth once daily.         Social History     Socioeconomic History    Marital status: Single   Tobacco Use    Smoking status: Never    Smokeless tobacco: Never   Substance and Sexual Activity    Alcohol use: Never    Drug use: Never    Sexual activity: Not Currently   Social History Narrative    ** Merged History Encounter **          Social Determinants of Health     Financial Resource Strain: Low Risk  (10/2/2023)    Overall Financial Resource Strain (CARDIA)     Difficulty of Paying Living Expenses: Not hard at all   Food Insecurity: No Food Insecurity (10/2/2023)    Hunger Vital Sign     Worried About Running Out of Food in the Last Year: Never true     Ran Out of Food in the Last Year: Never true   Transportation Needs: No Transportation Needs (10/2/2023)    PRAPARE - Transportation      Lack of Transportation (Medical): No     Lack of Transportation (Non-Medical): No   Physical Activity: Sufficiently Active (10/2/2023)    Exercise Vital Sign     Days of Exercise per Week: 5 days     Minutes of Exercise per Session: 30 min   Stress: No Stress Concern Present (10/2/2023)    [employer] - Occupational Stress Questionnaire     Feeling of Stress : Not at all   Social Connections: Moderately Integrated (10/2/2023)    Social Connection and Isolation Panel [NHANES]     Frequency of Communication with Friends and Family: Three times a week     Frequency of Social Gatherings with Friends and Family: Three times a week     Attends [employer] Services: 1 to 4 times per year     Active Member of Clubs or Organizations: No     Attends Club or Organization Meetings: 1 to 4 times per year     Marital Status: Never    Housing Stability: Unknown (10/2/2023)    Housing Stability Vital Sign     Unable to Pay for Housing in the Last Year: No     Unstable Housing in the Last Year: No        History reviewed. No pertinent family history.     Patient Care Team:  Herb Delgado MD as PCP - General (Family Medicine)  Herb Delgado MD Baquet, Todd, MD as Consulting Physician (Endocrinology)  Manuel Cantor MD as Consulting Physician (Gastroenterology)  Sid Culp MD as Consulting Physician (Obstetrics and Gynecology)       Subjective:     Diabetes  - tolerating medication (no side-effects)  - DXT: 110-120's  - watching diet    Review of Systems   Constitutional: Negative.    Respiratory: Negative.     Cardiovascular: Negative.    Gastrointestinal: Negative.    Genitourinary:         Scheduled for howard/pap on 12/2023 with GYN   Neurological:         Family reports memory loss   Psychiatric/Behavioral: Negative.           Patient Reported Health Risk Assessment  Are you male or female?: Female  During the past four weeks, how much have you been bothered by emotional problems  such as feeling anxious, depressed, irritable, sad, or downhearted and blue?: Not at all  During the past five weeks, has your physical and/or emotional health limited your social activities with family, friends, neighbors, or groups?: Not at all  During the past four weeks, how much bodily pain have you generally had?: No pain  During the past four weeks, was someone available to help if you needed and wanted help?: Yes, as much as I wanted  During the past four weeks, what was the hardest physical activity you could do for at least two minutes?: Light  Can you get to places out of walking distance without help?  (For example, can you travel alone on buses or taxis, or drive your own car?): Yes  Can you go shopping for groceries or clothes without someone's help?: No  Can you prepare your own meals?: No  Can you do your own housework without help?: No  Because of any health problems, do you need the help of another person with your personal care needs such as eating, bathing, dressing, or getting around the house?: Yes  Can you handle your own money without help?: No  During the past four weeks, how would you rate your health in general?: Good  How have things been going for you during the past four weeks?: Good and bad parts about equal  Are you having difficulties driving your car?: Not applicable, I do not use a car  Do you always fasten your seat belt when you are in a car?: Yes, usually  How often in the past four weeks have you been bothered by falling or dizzy when standing up?: Never  How often in the past four weeks have you been bothered by sexual problems?: Never  How often in the past four weeks have you been bothered by trouble eating well?: Never  How often in the past four weeks have you been bothered by teeth or denture problems?: Never  How often in the past four weeks have you been bothered with problems using the telephone?: Never  How often in the past four weeks have you been bothered by  "tiredness or fatigue?: Never  Have you fallen two or more times in the past year?: No  Are you afraid of falling?: No  Are you a smoker?: No  During the past four weeks, how many drinks of wine, beer, or other alcoholic beverages did you have?: No alcohol at all  Do you exercise for about 20 minutes three or more days a week?: Yes, most of the time  Have you been given any information to help you with hazards in your house that might hurt you?: Yes  Have you been given any information to help you with keeping track of your medications?: Yes  How often do you have trouble taking medicines the way you've been told to take them?: I always take them as prescribed  How confident are you that you can control and manage most of your health problems?: Very confident  What is your race? (Check all that apply.):     Objective:     /66 (BP Location: Left arm, Patient Position: Sitting, BP Method: Large (Manual))   Pulse 74   Temp 96.4 °F (35.8 °C)   Resp 18   Ht 4' 7" (1.397 m)   Wt 39 kg (86 lb)   SpO2 98%   BMI 19.99 kg/m²     Physical Exam  Constitutional:       Appearance: Normal appearance.   Cardiovascular:      Rate and Rhythm: Normal rate and regular rhythm.   Pulmonary:      Effort: Pulmonary effort is normal.      Breath sounds: Normal breath sounds.   Abdominal:      General: Abdomen is flat. Bowel sounds are normal.      Palpations: Abdomen is soft.   Neurological:      Mental Status: She is alert.   Psychiatric:         Mood and Affect: Mood normal.         Behavior: Behavior normal.         Thought Content: Thought content normal.         Judgment: Judgment normal.       Recent Results (from the past 504 hour(s))   HIV 1/2 Ag/Ab (4th Gen)    Collection Time: 09/29/23 10:12 AM   Result Value Ref Range    HIV Nonreactive Nonreactive   Hepatitis C Antibody    Collection Time: 09/29/23 10:12 AM   Result Value Ref Range    Hep C Ab Interp Nonreactive Nonreactive   Comprehensive Metabolic Panel    " Collection Time: 09/29/23 10:12 AM   Result Value Ref Range    Sodium Level 143 136 - 145 mmol/L    Potassium Level 4.7 3.5 - 5.1 mmol/L    Chloride 107 98 - 107 mmol/L    Carbon Dioxide 26 23 - 31 mmol/L    Glucose Level 109 82 - 115 mg/dL    Blood Urea Nitrogen 30.1 (H) 9.8 - 20.1 mg/dL    Creatinine 0.82 0.55 - 1.02 mg/dL    Calcium Level Total 10.2 8.4 - 10.2 mg/dL    Protein Total 7.1 5.8 - 7.6 gm/dL    Albumin Level 4.1 3.4 - 4.8 g/dL    Globulin 3.0 2.4 - 3.5 gm/dL    Albumin/Globulin Ratio 1.4 1.1 - 2.0 ratio    Bilirubin Total 0.2 <=1.5 mg/dL    Alkaline Phosphatase 47 40 - 150 unit/L    Alanine Aminotransferase 20 0 - 55 unit/L    Aspartate Aminotransferase 16 5 - 34 unit/L    eGFR >60 mls/min/1.73/m2   Lipid Panel    Collection Time: 09/29/23 10:12 AM   Result Value Ref Range    Cholesterol Total 150 <=200 mg/dL    HDL Cholesterol 62 (H) 35 - 60 mg/dL    Triglyceride 128 37 - 140 mg/dL    Cholesterol/HDL Ratio 2 0 - 5    Very Low Density Lipoprotein 26     LDL Cholesterol 62.00 50.00 - 140.00 mg/dL   Hemoglobin A1C    Collection Time: 09/29/23 10:12 AM   Result Value Ref Range    Hemoglobin A1c 5.8 <=7.0 %    Estimated Average Glucose 119.8 mg/dL   Microalbumin/Creatinine Ratio, Urine    Collection Time: 09/29/23 10:12 AM   Result Value Ref Range    Urine Microalbumin 22.5 <=30.0 ug/ml    Urine Creatinine 75.4 45.0 - 106.0 mg/dL    Microalbumin Creatinine Ratio 29.8 0.0 - 30.0 mg/gm Cr   CBC with Differential    Collection Time: 09/29/23 10:12 AM   Result Value Ref Range    WBC 11.19 4.50 - 11.50 x10(3)/mcL    RBC 3.45 (L) 4.20 - 5.40 x10(6)/mcL    Hgb 9.9 (L) 12.0 - 16.0 g/dL    Hct 32.7 (L) 37.0 - 47.0 %    MCV 94.8 (H) 80.0 - 94.0 fL    MCH 28.7 27.0 - 31.0 pg    MCHC 30.3 (L) 33.0 - 36.0 g/dL    RDW 14.5 11.5 - 17.0 %    Platelet 413 (H) 130 - 400 x10(3)/mcL    MPV 9.4 7.4 - 10.4 fL    Neut % 70.8 %    Lymph % 22.1 %    Mono % 5.3 %    Eos % 0.8 %    Basophil % 0.4 %    Lymph # 2.47 0.6 - 4.6  "x10(3)/mcL    Neut # 7.93 2.1 - 9.2 x10(3)/mcL    Mono # 0.59 0.1 - 1.3 x10(3)/mcL    Eos # 0.09 0 - 0.9 x10(3)/mcL    Baso # 0.04 <=0.2 x10(3)/mcL    IG# 0.07 (H) 0 - 0.04 x10(3)/mcL    IG% 0.6 %    NRBC% 0.0 %               No data to display                  10/2/2023    10:00 AM 4/3/2023     9:45 AM 9/8/2022    10:15 AM   Fall Risk Assessment - Outpatient   Mobility Status Ambulatory Ambulatory Ambulatory   Number of falls 0 0 0   Identified as fall risk False False False           Depression Screening  Over the past two weeks, has the patient felt down, depressed, or hopeless?: No  Over the past two weeks, has the patient felt little interest or pleasure in doing things?: No  Functional Ability/Safety Screening  Was the patient's timed Up & Go test unsteady or longer than 30 seconds?: No  Does the patient need help with phone, transportation, shopping, preparing meals, housework, laundry, meds, or managing money?: No  Does the patient's home have rugs in the hallway, lack grab bars in the bathroom, lack handrails on the stairs or have poor lighting?: No  Have you noticed any hearing difficulties?: No  A "Yes" response to any of the above questions should trigger further investigation.: N  Cognitive Function (Assessed through direct observation with due consideration of information obtained by way of patient reports and/or concerns raised by family, friends, caretakers, or others)    Does the patient repeat questions/statements in the same day?: No  Does the patient have trouble remembering the date, year, and time?: No  Does the patient have difficulty managing finances?: No  Does the patient have a decreased sense of direction?: No  A "Yes" response to any of the above questions could indicate mild cognitive impairment and should trigger further investigation.: N  Assessment/Plan:     1. Wellness examination  Lab work reviewed with patient  Continue current medication  Continue diet/exercise  Advanced " directive discussed with patient  Return to clinic with any concerns    Advance Care Planning     Date: 10/02/2023    Living Will  During this visit, I engaged the patient and family  in the voluntary advance care planning process.  The patient and I reviewed the role for advance directives and their purpose in directing future healthcare if the patient's unable to speak for him/herself.  At this point in time, the patient does have full decision-making capacity.  We discussed different extreme health states that she could experience, and reviewed what kind of medical care she would want in those situations.  The patient and family communicated that if she were comatose and had little chance of a meaningful recovery, she is not sure if she would want machines/life-sustaining treatments used.  Family to discuss further with patient and notify clinic of wishes.  I spent a total of 5 minutes engaging the patient in this advance care planning discussion.     2. Memory loss  -     Ambulatory referral/consult to Neurology; Future; Expected date: 10/09/2023  Refer patient to Dr. Venegas     3. Screening mammogram, encounter for  Scheduled for Lachelle/Pap with Dr. Jaramillo on 12/12/2023    4. Encounter for annual routine gynecological examination           Medicare Annual Wellness and Personalized Prevention Plan:   Fall Risk + Home Safety + Hearing Impairment + Depression Screen + Opioid and Substance Abuse Screening + Cognitive Impairment Screen + Health Risk Assessment all reviewed.     Health Maintenance Topics with due status: Not Due       Topic Last Completion Date    Colorectal Cancer Screening 11/28/2022    Diabetes Urine Screening 09/29/2023    Hemoglobin A1c (Prediabetes) 09/29/2023    Lipid Panel 09/29/2023    Hemoglobin A1c 09/29/2023      The patient's Health Maintenance was reviewed and the following appears to be due at this time:   Health Maintenance Due   Topic Date Due    TETANUS VACCINE  Never done    Shingles  Vaccine (1 of 2) Never done    COVID-19 Vaccine (3 - Pfizer series) 11/03/2021    Influenza Vaccine (1) 09/01/2023    Eye Exam  Never done    Cervical Cancer Screening  09/27/2023       Advance Care Planning   I attest to discussing Advance Care Planning with patient and/or family member.  Education was provided including the importance of the Health Care Power of , Advance Directives, and/or [place] documentation.  The patient expressed understanding to the importance of this information and discussion.         Follow up in about 6 months (around 4/2/2024). In addition to their scheduled follow up, the patient has also been instructed to follow up on as needed basis.

## 2023-10-03 ENCOUNTER — TELEPHONE (OUTPATIENT)
Dept: FAMILY MEDICINE | Facility: CLINIC | Age: 63
End: 2023-10-03
Payer: MEDICARE

## 2023-10-03 NOTE — TELEPHONE ENCOUNTER
----- Message from Herb Delgado MD sent at 10/2/2023  4:33 PM CDT -----  Diabetic Eye Results: Please inform patient of NORMAL Diabetic Eye Screen, indicating no signs of damage to the retina from diabetes. Repeat screening in 1 year.

## 2023-12-04 DIAGNOSIS — I10 HYPERTENSION, UNSPECIFIED TYPE: ICD-10-CM

## 2023-12-04 RX ORDER — LISINOPRIL AND HYDROCHLOROTHIAZIDE 12.5; 2 MG/1; MG/1
1 TABLET ORAL
Qty: 90 TABLET | Refills: 3 | Status: SHIPPED | OUTPATIENT
Start: 2023-12-04

## 2023-12-12 ENCOUNTER — OFFICE VISIT (OUTPATIENT)
Dept: NEUROLOGY | Facility: CLINIC | Age: 63
End: 2023-12-12
Payer: MEDICARE

## 2023-12-12 VITALS
SYSTOLIC BLOOD PRESSURE: 112 MMHG | HEIGHT: 55 IN | WEIGHT: 87 LBS | DIASTOLIC BLOOD PRESSURE: 72 MMHG | BODY MASS INDEX: 20.13 KG/M2

## 2023-12-12 DIAGNOSIS — R41.3 MEMORY LOSS: ICD-10-CM

## 2023-12-12 DIAGNOSIS — F09 COGNITIVE DYSFUNCTION: ICD-10-CM

## 2023-12-12 DIAGNOSIS — I10 HYPERTENSION, UNSPECIFIED TYPE: ICD-10-CM

## 2023-12-12 DIAGNOSIS — G47.33 OSA (OBSTRUCTIVE SLEEP APNEA): ICD-10-CM

## 2023-12-12 DIAGNOSIS — R41.3 OTHER AMNESIA: Primary | ICD-10-CM

## 2023-12-12 DIAGNOSIS — Z87.820 H/O TRAUMATIC BRAIN INJURY: ICD-10-CM

## 2023-12-12 PROCEDURE — 99999 PR PBB SHADOW E&M-EST. PATIENT-LVL III: CPT | Mod: PBBFAC,,, | Performed by: NURSE PRACTITIONER

## 2023-12-12 PROCEDURE — 99213 OFFICE O/P EST LOW 20 MIN: CPT | Mod: PBBFAC | Performed by: NURSE PRACTITIONER

## 2023-12-12 PROCEDURE — 99214 PR OFFICE/OUTPT VISIT, EST, LEVL IV, 30-39 MIN: ICD-10-PCS | Mod: S$PBB,,, | Performed by: NURSE PRACTITIONER

## 2023-12-12 PROCEDURE — 99999 PR PBB SHADOW E&M-EST. PATIENT-LVL III: ICD-10-PCS | Mod: PBBFAC,,, | Performed by: NURSE PRACTITIONER

## 2023-12-12 PROCEDURE — 99214 OFFICE O/P EST MOD 30 MIN: CPT | Mod: S$PBB,,, | Performed by: NURSE PRACTITIONER

## 2023-12-12 RX ORDER — MULTIVITAMIN
1 TABLET ORAL DAILY
COMMUNITY
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
## 2023-12-13 ENCOUNTER — PROCEDURE VISIT (OUTPATIENT)
Dept: SLEEP MEDICINE | Facility: HOSPITAL | Age: 63
End: 2023-12-13
Attending: NURSE PRACTITIONER
Payer: MEDICARE

## 2023-12-13 DIAGNOSIS — Z87.820 H/O TRAUMATIC BRAIN INJURY: Primary | ICD-10-CM

## 2023-12-13 DIAGNOSIS — E11.42 TYPE 2 DIABETES MELLITUS WITH DIABETIC POLYNEUROPATHY, WITHOUT LONG-TERM CURRENT USE OF INSULIN: ICD-10-CM

## 2023-12-13 DIAGNOSIS — I10 HYPERTENSION, UNSPECIFIED TYPE: ICD-10-CM

## 2023-12-13 DIAGNOSIS — I10 PRIMARY HYPERTENSION: ICD-10-CM

## 2023-12-13 DIAGNOSIS — F09 COGNITIVE DYSFUNCTION: ICD-10-CM

## 2023-12-13 DIAGNOSIS — G47.33 OSA (OBSTRUCTIVE SLEEP APNEA): ICD-10-CM

## 2023-12-13 DIAGNOSIS — R41.3 MEMORY LOSS: ICD-10-CM

## 2023-12-13 PROCEDURE — 95806 SLEEP STUDY UNATT&RESP EFFT: CPT | Mod: 26,,, | Performed by: SPECIALIST

## 2023-12-13 PROCEDURE — 95806 SLEEP STUDY UNATT&RESP EFFT: CPT

## 2023-12-13 PROCEDURE — 95806 PR SLEEP STUDY, UNATTENDED, SIMUL RECORD HR/O2 SAT/RESP FLOW/RESP EFFT: ICD-10-PCS | Mod: 26,,, | Performed by: SPECIALIST

## 2024-01-04 ENCOUNTER — OFFICE VISIT (OUTPATIENT)
Dept: NEUROLOGY | Facility: CLINIC | Age: 64
End: 2024-01-04
Payer: MEDICARE

## 2024-01-04 DIAGNOSIS — F09 COGNITIVE DYSFUNCTION: Primary | ICD-10-CM

## 2024-01-04 DIAGNOSIS — E11.42 TYPE 2 DIABETES MELLITUS WITH DIABETIC POLYNEUROPATHY, WITHOUT LONG-TERM CURRENT USE OF INSULIN: ICD-10-CM

## 2024-01-04 DIAGNOSIS — G47.33 OSA (OBSTRUCTIVE SLEEP APNEA): ICD-10-CM

## 2024-01-04 PROCEDURE — 99213 OFFICE O/P EST LOW 20 MIN: CPT | Mod: 95,,, | Performed by: NURSE PRACTITIONER

## 2024-01-04 NOTE — PROGRESS NOTES
Virtual Visit Note    Subjective:          Patient ID: Lucretia Bean is a 63 y.o. female.    Chief Complaint: HST results; cog complaints     HPI:           The patient location is: home   Visit type: audiovisual    HST:  AHI 10  Wilmer 84%  <88% 3 minutes       This is a telemedicine note. After obtaining verbal consent/patient identification using name and , patient was treated using real time audio/video, according to [facility] protocols. IWes NP [distant provider], conducted the visit from location identified below. The patient participated in the visit at a non-[facility] location selected by the patient (or patients representative), identified below. I am licensed in the state where the patient stated they are located. The patient (or patients representative) stated that they understood and accepted the privacy and security risks to their information at their location.    Distant provider was located at [facility]    Each patient to whom he or she provides medical services by telemedicine is:  (1) informed of the relationship between the physician and patient and the respective role of any other health care provider with respect to management of the patient; and (2) notified that he or she may decline to receive medical services by telemedicine and may withdraw from such care at any time.          Past Medical History:   Diagnosis Date    Depression     Essential (primary) hypertension     Mixed hyperlipidemia     Type 2 diabetes mellitus without complications        Past Surgical History:   Procedure Laterality Date    TUBAL LIGATION         History reviewed. No pertinent family history.    Social History     Socioeconomic History    Marital status: Single   Tobacco Use    Smoking status: Never    Smokeless tobacco: Never   Substance and Sexual Activity    Alcohol use: Never    Drug use: Never    Sexual activity: Not Currently   Social History Narrative    ** Merged  History Encounter **          Social Determinants of Health     Financial Resource Strain: Low Risk  (10/2/2023)    Overall Financial Resource Strain (CARDIA)     Difficulty of Paying Living Expenses: Not hard at all   Food Insecurity: No Food Insecurity (10/2/2023)    Hunger Vital Sign     Worried About Running Out of Food in the Last Year: Never true     Ran Out of Food in the Last Year: Never true   Transportation Needs: No Transportation Needs (10/2/2023)    PRAPARE - Transportation     Lack of Transportation (Medical): No     Lack of Transportation (Non-Medical): No   Physical Activity: Sufficiently Active (10/2/2023)    Exercise Vital Sign     Days of Exercise per Week: 5 days     Minutes of Exercise per Session: 30 min   Stress: No Stress Concern Present (10/2/2023)    [place] [employer] - Occupational Stress Questionnaire     Feeling of Stress : Not at all   Social Connections: Moderately Integrated (10/2/2023)    Social Connection and Isolation Panel [NHANES]     Frequency of Communication with Friends and Family: Three times a week     Frequency of Social Gatherings with Friends and Family: Three times a week     Attends Muslim Services: 1 to 4 times per year     Active Member of Clubs or Organizations: No     Attends Club or Organization Meetings: 1 to 4 times per year     Marital Status: Never    Housing Stability: Unknown (10/2/2023)    Housing Stability Vital Sign     Unable to Pay for Housing in the Last Year: No     Unstable Housing in the Last Year: No       Review of patient's allergies indicates:  No Known Allergies    Current Outpatient Medications   Medication Instructions    lisinopriL-hydrochlorothiazide (PRINZIDE,ZESTORETIC) 20-12.5 mg per tablet 1 tablet, Oral    metFORMIN (GLUCOPHAGE) 1,000 mg, Oral, 2 times daily    multivitamin (ONE DAILY MULTIVITAMIN) per tablet 1 tablet, Oral, Daily    pioglitazone (ACTOS) 30 mg, Oral, Daily    rosuvastatin (CRESTOR) 40  mg, Oral, Daily         Objective:      Physical Exam:  General- Patient alert and oriented x3 in NAD  Speech - nml  HEENT- EOMI  Resp- No increased WOB noted. Not using accessory muscles.  Skin-  No Jaundice. No visible skin lesions.        Assessment/Plan:     Problem List Items Addressed This Visit       Cognitive dysfunction - Primary    Type 2 diabetes mellitus with diabetic polyneuropathy, without long-term current use of insulin    CHENTE (obstructive sleep apnea)    In lab PAP titration scheduled for this upcoming month    CT head images/report reviewed; age related volume loss with old R frontal encephalomalacia     Hold off on additional medication at this time [Memantine/Donepezil]; will treat the CHENTE and may consider adding Memantine at next OV.     Follow up TBD          Wes Bello, MSN, APRN, AGACNP-BC

## 2024-01-05 ENCOUNTER — TELEPHONE (OUTPATIENT)
Dept: NEUROLOGY | Facility: CLINIC | Age: 64
End: 2024-01-05
Payer: MEDICARE

## 2024-01-05 NOTE — TELEPHONE ENCOUNTER
----- Message from Raúl Venegas MD sent at 1/3/2024  5:14 PM CST -----  Images and report reviewed  no particular addn comments   no new orders

## 2024-01-11 ENCOUNTER — PATIENT MESSAGE (OUTPATIENT)
Dept: NEUROLOGY | Facility: CLINIC | Age: 64
End: 2024-01-11
Payer: MEDICARE

## 2024-01-22 ENCOUNTER — PROCEDURE VISIT (OUTPATIENT)
Dept: SLEEP MEDICINE | Facility: HOSPITAL | Age: 64
End: 2024-01-22
Attending: SPECIALIST
Payer: MEDICARE

## 2024-01-22 DIAGNOSIS — Z87.820 H/O TRAUMATIC BRAIN INJURY: ICD-10-CM

## 2024-01-22 DIAGNOSIS — G47.33 OSA (OBSTRUCTIVE SLEEP APNEA): ICD-10-CM

## 2024-01-22 DIAGNOSIS — F09 COGNITIVE DYSFUNCTION: ICD-10-CM

## 2024-01-22 DIAGNOSIS — E11.42 TYPE 2 DIABETES MELLITUS WITH DIABETIC POLYNEUROPATHY, WITHOUT LONG-TERM CURRENT USE OF INSULIN: ICD-10-CM

## 2024-01-22 DIAGNOSIS — I10 PRIMARY HYPERTENSION: ICD-10-CM

## 2024-01-22 PROCEDURE — 95811 POLYSOM 6/>YRS CPAP 4/> PARM: CPT | Mod: 26,,, | Performed by: SPECIALIST

## 2024-01-22 PROCEDURE — 95811 POLYSOM 6/>YRS CPAP 4/> PARM: CPT

## 2024-02-05 ENCOUNTER — OFFICE VISIT (OUTPATIENT)
Dept: NEUROLOGY | Facility: CLINIC | Age: 64
End: 2024-02-05
Payer: MEDICARE

## 2024-02-05 VITALS
SYSTOLIC BLOOD PRESSURE: 98 MMHG | HEIGHT: 55 IN | BODY MASS INDEX: 20.13 KG/M2 | DIASTOLIC BLOOD PRESSURE: 64 MMHG | WEIGHT: 87 LBS

## 2024-02-05 DIAGNOSIS — F09 COGNITIVE DYSFUNCTION: ICD-10-CM

## 2024-02-05 DIAGNOSIS — G47.33 OSA (OBSTRUCTIVE SLEEP APNEA): Primary | ICD-10-CM

## 2024-02-05 PROCEDURE — 99999 PR PBB SHADOW E&M-EST. PATIENT-LVL III: CPT | Mod: PBBFAC,,, | Performed by: NURSE PRACTITIONER

## 2024-02-05 PROCEDURE — 99214 OFFICE O/P EST MOD 30 MIN: CPT | Mod: S$PBB,,, | Performed by: NURSE PRACTITIONER

## 2024-02-05 PROCEDURE — 99213 OFFICE O/P EST LOW 20 MIN: CPT | Mod: PBBFAC | Performed by: NURSE PRACTITIONER

## 2024-02-05 NOTE — PROGRESS NOTES
Neurology Note - Sleep follow up    Subjective:         Patient ID: Lucretia Bean is a 63 y.o. female.    Chief Complaint: F/U Memory Test results/CPAP titration night     HPI:            Memory unchanged; still forgetful  Easily upset or aggravated    B12/TSH ok  UA +WBC, trace bacteria, neg leuk est and nitrate    PAP titration:  AHI 5.8  Wilmer 88%  <88% 1.7      ROS: as per HPI, otherwise pertinent systems review is negative             Past Medical History:   Diagnosis Date    Depression     Essential (primary) hypertension     Mixed hyperlipidemia     Type 2 diabetes mellitus without complications        Past Surgical History:   Procedure Laterality Date    TUBAL LIGATION         History reviewed. No pertinent family history.    Social History     Socioeconomic History    Marital status: Single   Tobacco Use    Smoking status: Never    Smokeless tobacco: Never   Substance and Sexual Activity    Alcohol use: Never    Drug use: Never    Sexual activity: Not Currently   Social History Narrative    ** Merged History Encounter **          Social Determinants of Health     Financial Resource Strain: Low Risk  (10/2/2023)    Overall Financial Resource Strain (CARDIA)     Difficulty of Paying Living Expenses: Not hard at all   Food Insecurity: No Food Insecurity (10/2/2023)    Hunger Vital Sign     Worried About Running Out of Food in the Last Year: Never true     Ran Out of Food in the Last Year: Never true   Transportation Needs: No Transportation Needs (10/2/2023)    PRAPARE - Transportation     Lack of Transportation (Medical): No     Lack of Transportation (Non-Medical): No   Physical Activity: Sufficiently Active (10/2/2023)    Exercise Vital Sign     Days of Exercise per Week: 5 days     Minutes of Exercise per Session: 30 min   Stress: No Stress Concern Present (10/2/2023)    [employer] [employer]ational [employer] - Occupational Stress Questionnaire     Feeling of Stress : Not at all   Social  "Connections: Moderately Integrated (10/2/2023)    Social Connection and Isolation Panel [NHANES]     Frequency of Communication with Friends and Family: Three times a week     Frequency of Social Gatherings with Friends and Family: Three times a week     Attends Rastafari Services: 1 to 4 times per year     Active Member of Clubs or Organizations: No     Attends Club or Organization Meetings: 1 to 4 times per year     Marital Status: Never    Housing Stability: Unknown (10/2/2023)    Housing Stability Vital Sign     Unable to Pay for Housing in the Last Year: No     Unstable Housing in the Last Year: No       Review of patient's allergies indicates:  No Known Allergies    Current Outpatient Medications   Medication Instructions    lisinopriL-hydrochlorothiazide (PRINZIDE,ZESTORETIC) 20-12.5 mg per tablet 1 tablet, Oral    metFORMIN (GLUCOPHAGE) 1,000 mg, Oral, 2 times daily    multivitamin (ONE DAILY MULTIVITAMIN) per tablet 1 tablet, Oral, Daily    pioglitazone (ACTOS) 30 mg, Oral, Daily    rosuvastatin (CRESTOR) 40 mg, Oral, Daily         Objective:      Exam:   BP 98/64   Ht 4' 7" (1.397 m)   Wt 39.5 kg (87 lb)   BMI 20.22 kg/m²     Physical Exam  Vitals reviewed.   Constitutional:       Appearance: Thin      Accompanied by: niece in law (Lashawn) is here today  HENT:      Ears:      Comments: Hearing normal.  Eyes:      Extraocular Movements: Extraocular movements intact.      VF's ok  Cardiovascular:      Rate and Rhythm: Normal rate and regular rhythm.   Pulmonary:      Effort: Pulmonary effort is normal.      Breath sounds: Normal breath sounds.   Musculoskeletal:         General: Normal range of motion.   Skin:     General: Skin is warm and dry.   Neurological:      General: No focal deficit present.      Mental Status: alert and oriented to person, place, and time.      Speech: nml     Face: symmetric; tongue midline     Motor: nonlateralizing     Gait: unassisted and normal.  Psychiatric:         " Mood and Affect: Mood normal.         Behavior: Behavior normal.         Assessment/Plan:     Problem List Items Addressed This Visit       Cognitive dysfunction    CHENTE (obstructive sleep apnea) - Primary  Agitation/irritably  Insomnia          Start autopap 6-10 cm  DME: [facility]    Considered Memantine [no Aricept as she has insomnia]  Considered addition of SSRI/SNRI; held off today     Add Melatonin 1 mg at bed time to start then increase slowly to 10 mg max nightly dose     Follow up in 8-10 weeks           Wes Bello, MSN, APRN, AGACNP-BC

## 2024-02-09 ENCOUNTER — TELEPHONE (OUTPATIENT)
Dept: NEUROLOGY | Facility: CLINIC | Age: 64
End: 2024-02-09
Payer: MEDICARE

## 2024-02-09 NOTE — TELEPHONE ENCOUNTER
----- Message from Nury Love RRT sent at 2/8/2024  4:20 PM CST -----  Regarding: setup notes  Patient was setup with: resmed Airsense 10 autoset at 6-10cm with autoramp at 5cm, slim tube, HH    Patient was fitted with: Mirage quattro x-small  Patient was instructed on the proper use and operation of equipment. Patient caregiver verbalized understanding of instructions given and compliance guidelines. We will follow as needed.

## 2024-02-15 LAB — BCS RECOMMENDATION EXT: NORMAL

## 2024-02-28 ENCOUNTER — LAB VISIT (OUTPATIENT)
Dept: LAB | Facility: HOSPITAL | Age: 64
End: 2024-02-28
Attending: STUDENT IN AN ORGANIZED HEALTH CARE EDUCATION/TRAINING PROGRAM
Payer: MEDICARE

## 2024-02-28 DIAGNOSIS — M81.0 OSTEOPOROSIS, UNSPECIFIED OSTEOPOROSIS TYPE, UNSPECIFIED PATHOLOGICAL FRACTURE PRESENCE: Primary | ICD-10-CM

## 2024-02-28 LAB
ANION GAP SERPL CALC-SCNC: 8 MEQ/L
BUN SERPL-MCNC: 35.4 MG/DL (ref 9.8–20.1)
CALCIUM SERPL-MCNC: 9.8 MG/DL (ref 8.4–10.2)
CHLORIDE SERPL-SCNC: 110 MMOL/L (ref 98–107)
CO2 SERPL-SCNC: 24 MMOL/L (ref 23–31)
CREAT SERPL-MCNC: 0.85 MG/DL (ref 0.55–1.02)
CREAT/UREA NIT SERPL: 42
DEPRECATED CALCIDIOL+CALCIFEROL SERPL-MC: 44 NG/ML (ref 30–80)
GFR SERPLBLD CREATININE-BSD FMLA CKD-EPI: >60 MLS/MIN/1.73/M2
GLUCOSE SERPL-MCNC: 122 MG/DL (ref 82–115)
POTASSIUM SERPL-SCNC: 4.3 MMOL/L (ref 3.5–5.1)
SODIUM SERPL-SCNC: 142 MMOL/L (ref 136–145)

## 2024-02-28 PROCEDURE — 82306 VITAMIN D 25 HYDROXY: CPT

## 2024-02-28 PROCEDURE — 80048 BASIC METABOLIC PNL TOTAL CA: CPT

## 2024-02-28 PROCEDURE — 36415 COLL VENOUS BLD VENIPUNCTURE: CPT

## 2024-02-29 PROBLEM — M81.0 AGE-RELATED OSTEOPOROSIS WITHOUT CURRENT PATHOLOGICAL FRACTURE: Status: ACTIVE | Noted: 2024-02-29

## 2024-03-27 ENCOUNTER — PATIENT MESSAGE (OUTPATIENT)
Dept: NEUROLOGY | Facility: CLINIC | Age: 64
End: 2024-03-27
Payer: MEDICARE

## 2024-04-01 ENCOUNTER — OFFICE VISIT (OUTPATIENT)
Dept: NEUROLOGY | Facility: CLINIC | Age: 64
End: 2024-04-01
Payer: MEDICARE

## 2024-04-01 VITALS
SYSTOLIC BLOOD PRESSURE: 102 MMHG | DIASTOLIC BLOOD PRESSURE: 64 MMHG | WEIGHT: 90 LBS | BODY MASS INDEX: 20.83 KG/M2 | HEIGHT: 55 IN

## 2024-04-01 DIAGNOSIS — G47.33 OSA (OBSTRUCTIVE SLEEP APNEA): Primary | ICD-10-CM

## 2024-04-01 PROCEDURE — 99999 PR PBB SHADOW E&M-EST. PATIENT-LVL III: CPT | Mod: PBBFAC,,, | Performed by: NURSE PRACTITIONER

## 2024-04-01 PROCEDURE — 99213 OFFICE O/P EST LOW 20 MIN: CPT | Mod: PBBFAC | Performed by: NURSE PRACTITIONER

## 2024-04-01 PROCEDURE — 99213 OFFICE O/P EST LOW 20 MIN: CPT | Mod: S$PBB,,, | Performed by: NURSE PRACTITIONER

## 2024-04-01 NOTE — PROGRESS NOTES
Neurology Note - Sleep follow up    Subjective:         Patient ID: Lucretia Bean is a 63 y.o. female.    Chief Complaint: 3 month CHENTE     HPI:            Pt reports nightly usage of pap machine; pap therapy beneficial for sleep. Refreshed awakenings, denies EDS. Tolerating mask/pressure well. DME is [facility]    PAP data:   Date range: 3/2/24-3/31/24  % of usage >= 4 hrs: 90  AHI: 8.1  Auto 6-10    ROS: as per HPI, otherwise pertinent systems review is negative              4/1/2024     9:05 AM   EPWORTH SLEEPINESS SCALE   Sitting and reading 0   Watching TV 0   Sitting, inactive in a public place (e.g. a theatre or a meeting) 0   As a passenger in a car for an hour without a break 0   Lying down to rest in the afternoon when circumstances permit 2   Sitting and talking to someone 0   Sitting quietly after a lunch without alcohol 1   In a car, while stopped for a few minutes in traffic 0   Total score 3        Past Medical History:   Diagnosis Date    Carbohydrate intolerance     Depression     Essential (primary) hypertension     Mixed hyperlipidemia     Type 2 diabetes mellitus without complications     Weight loss of more than 10% body weight     reports wt loss of 1/2 of body weight       Past Surgical History:   Procedure Laterality Date    TUBAL LIGATION         Family History   Problem Relation Age of Onset    Arthritis Mother     Thyroid disease Mother        Social History     Socioeconomic History    Marital status: Single   Tobacco Use    Smoking status: Never    Smokeless tobacco: Never   Substance and Sexual Activity    Alcohol use: Never    Drug use: Never    Sexual activity: Not Currently   Social History Narrative    ** Merged History Encounter **          Social Determinants of Health     Financial Resource Strain: Low Risk  (10/2/2023)    Overall Financial Resource Strain (CARDIA)     Difficulty of Paying Living Expenses: Not hard at all   Food Insecurity: No Food Insecurity (10/2/2023)     "Hunger Vital Sign     Worried About Running Out of Food in the Last Year: Never true     Ran Out of Food in the Last Year: Never true   Transportation Needs: No Transportation Needs (10/2/2023)    PRAPARE - Transportation     Lack of Transportation (Medical): No     Lack of Transportation (Non-Medical): No   Physical Activity: Sufficiently Active (10/2/2023)    Exercise Vital Sign     Days of Exercise per Week: 5 days     Minutes of Exercise per Session: 30 min   Stress: No Stress Concern Present (10/2/2023)    [place] [employer]ational [employer] - Occupational Stress Questionnaire     Feeling of Stress : Not at all   Social Connections: Moderately Integrated (10/2/2023)    Social Connection and Isolation Panel [NHANES]     Frequency of Communication with Friends and Family: Three times a week     Frequency of Social Gatherings with Friends and Family: Three times a week     Attends Jainism Services: 1 to 4 times per year     Active Member of Clubs or Organizations: No     Attends Club or Organization Meetings: 1 to 4 times per year     Marital Status: Never    Housing Stability: Unknown (10/2/2023)    Housing Stability Vital Sign     Unable to Pay for Housing in the Last Year: No     Unstable Housing in the Last Year: No       Review of patient's allergies indicates:  No Known Allergies    Current Outpatient Medications   Medication Instructions    alendronate (FOSAMAX) 70 mg, Oral, Every 7 days    calcium carbonate-vitamin D3 1,000 mg-20 mcg (800 unit) Tab 1 tablet, Oral, Once    lisinopriL-hydrochlorothiazide (PRINZIDE,ZESTORETIC) 20-12.5 mg per tablet 1 tablet, Oral    metFORMIN (GLUCOPHAGE) 1,000 mg, Oral, 2 times daily    multivitamin (ONE DAILY MULTIVITAMIN) per tablet 1 tablet, Oral, Daily    pioglitazone (ACTOS) 30 mg, Oral, Daily    rosuvastatin (CRESTOR) 40 mg, Oral, Daily         Objective:      Exam:   /64 (BP Location: Right arm, Patient Position: Sitting)   Ht 4' 7" (1.397 m)   " Wt 40.8 kg (90 lb)   BMI 20.92 kg/m²     Physical Exam  Vitals reviewed.   Constitutional:       Appearance: Thin      Accompanied by: niece in law (Lashawn) is here today  HENT:      Ears:      Comments: Hearing normal.  Eyes:      Extraocular Movements: Extraocular movements intact.      VF's ok  Musculoskeletal:         General: Normal range of motion.   Skin:     General: Skin is warm and dry.   Neurological:      General: No focal deficit present.      Mental Status: alert and oriented to person, place, and time.      Speech: nml     Face: symmetric; tongue midline     Motor: nonlateralizing     Gait: unassisted and normal.  Psychiatric:         Mood and Affect: Mood normal.         Behavior: Behavior normal.         Assessment/Plan:     Problem List Items Addressed This Visit          Other    CHENTE (obstructive sleep apnea) - Primary    Patient is benefiting from PAP therapy; Encouraged continued use of PAP. Drowsy driving may still occur despite PAP use. Clinical follow up and replacement of supplies discussed.    AHI 8.1; will increase the max auto to 15 cm as 95% of the time pressure is at max of 10. She needs a higher pressure to bring the AHI down to 5 or less.     DME:[facility]    PLEASE REPEAT MMSE AT NEXT OV     FU in 3 months            Wes Bello, MSN, APRN, AGACNP-BC

## 2024-04-08 ENCOUNTER — OFFICE VISIT (OUTPATIENT)
Dept: FAMILY MEDICINE | Facility: CLINIC | Age: 64
End: 2024-04-08
Payer: MEDICARE

## 2024-04-08 ENCOUNTER — PATIENT OUTREACH (OUTPATIENT)
Dept: ADMINISTRATIVE | Facility: HOSPITAL | Age: 64
End: 2024-04-08
Payer: MEDICARE

## 2024-04-08 VITALS
HEIGHT: 55 IN | SYSTOLIC BLOOD PRESSURE: 98 MMHG | HEART RATE: 74 BPM | TEMPERATURE: 98 F | WEIGHT: 88 LBS | OXYGEN SATURATION: 97 % | RESPIRATION RATE: 18 BRPM | BODY MASS INDEX: 20.37 KG/M2 | DIASTOLIC BLOOD PRESSURE: 58 MMHG

## 2024-04-08 DIAGNOSIS — G47.33 OSA ON CPAP: ICD-10-CM

## 2024-04-08 DIAGNOSIS — E11.42 TYPE 2 DIABETES MELLITUS WITH DIABETIC POLYNEUROPATHY, WITHOUT LONG-TERM CURRENT USE OF INSULIN: Primary | ICD-10-CM

## 2024-04-08 DIAGNOSIS — I10 HYPERTENSION, UNSPECIFIED TYPE: ICD-10-CM

## 2024-04-08 LAB — HBA1C MFR BLD: 4.7 %

## 2024-04-08 PROCEDURE — 83036 HEMOGLOBIN GLYCOSYLATED A1C: CPT | Mod: QW,,, | Performed by: FAMILY MEDICINE

## 2024-04-08 PROCEDURE — 99214 OFFICE O/P EST MOD 30 MIN: CPT | Mod: ,,, | Performed by: FAMILY MEDICINE

## 2024-04-08 NOTE — LETTER
AUTHORIZATION FOR RELEASE OF   CONFIDENTIAL INFORMATION    Dear Dr. Culp, Fax [phone]    We are seeing Lucretia Bean, date of birth 1960, in the clinic at [facility]. Herb Delgado MD is the patient's PCP. Lucretia Bean has an outstanding lab/procedure at the time we reviewed her chart. In order to help keep her health information updated, she has authorized us to request the following medical record(s):        (  )  MAMMOGRAM                                      (  )  COLONOSCOPY      ( X )  PAP SMEAR                                          (  )  OUTSIDE LAB RESULTS     (  )  DEXA SCAN                                          (  )  EYE EXAM            (  )  FOOT EXAM                                          (  )  ENTIRE RECORD     (  )  OUTSIDE IMMUNIZATIONS                 (  )  _______________         Please fax records to [facility], Bergeaux, Scott J, MD, [phone] or [phone].       If you have any questions you can contact Mariella Dante at [phone].           Patient Name: Lucretia Bean  : 1960  Patient Phone #: [phone]

## 2024-04-08 NOTE — PROGRESS NOTES
"Subjective:       Patient ID: Lucretia Bean is a 63 y.o. female.    Chief Complaint: 6 MONTH and CPAP       Routine    Diabetes  - tolerating medication (no side-effects)  - DXT: 's  - watching diet    Hypertension  - tolerating medication (no side effects)  - no headaches  - patient without any complaints    Review of Systems   Constitutional: Negative.    Respiratory: Negative.          CHENTE: using CPAP nightly, patient reports much more energy   Cardiovascular: Negative.    Gastrointestinal: Negative.    Genitourinary:         Recent Lachelle/Pap with Dr. Culp   Psychiatric/Behavioral: Negative.             Objective:      BP (!) 98/58 (BP Location: Left arm, Patient Position: Sitting, BP Method: Large (Manual))   Pulse 74   Temp 97.5 °F (36.4 °C)   Resp 18   Ht 4' 7" (1.397 m)   Wt 39.9 kg (88 lb)   SpO2 97%   BMI 20.45 kg/m²    Physical Exam  Constitutional:       Appearance: Normal appearance.   Cardiovascular:      Rate and Rhythm: Normal rate and regular rhythm.      Pulses:           Dorsalis pedis pulses are 1+ on the right side and 1+ on the left side.        Posterior tibial pulses are 1+ on the right side and 1+ on the left side.      Heart sounds: Normal heart sounds.   Pulmonary:      Effort: Pulmonary effort is normal.      Breath sounds: Normal breath sounds.   Musculoskeletal:      Right foot: Normal range of motion. No deformity.      Left foot: Normal range of motion. No deformity.   Feet:      Right foot:      Protective Sensation: 5 sites tested.  5 sites sensed.      Skin integrity: Callus present.      Toenail Condition: Right toenails are normal.      Left foot:      Protective Sensation: 5 sites tested.  5 sites sensed.      Skin integrity: Callus present.      Toenail Condition: Left toenails are normal.   Neurological:      Mental Status: She is alert.   Psychiatric:         Mood and Affect: Mood normal.         Behavior: Behavior normal.         Thought Content: Thought " content normal.         Judgment: Judgment normal.             Recent Results (from the past 504 hour(s))   POCT HEMOGLOBIN A1C    Collection Time: 04/08/24  9:17 AM   Result Value Ref Range    Hemoglobin A1C, POC 4.7 %        Assessment:       Problem List Items Addressed This Visit          Cardiac/Vascular    Hypertension       Endocrine    Type 2 diabetes mellitus with diabetic polyneuropathy, without long-term current use of insulin - Primary    Relevant Orders    POCT HEMOGLOBIN A1C (Completed)       Other    CHENTE on CPAP          Plan:   1. Type 2 diabetes mellitus with diabetic polyneuropathy, without long-term current use of insulin  -     POCT HEMOGLOBIN A1C  Controlled  Continue current Rx medication  Return to clinic with any concerns    2. Hypertension, unspecified type  Controlled  Continue current Rx medication  Return to clinic with any concerns    3. CHENTE on CPAP  Continue nightly CPAP  Keep scheduled appointment with neurology

## 2024-04-08 NOTE — LETTER
AUTHORIZATION FOR RELEASE OF   CONFIDENTIAL INFORMATION    Dear [facility],    We are seeing Lucretia Bean, date of birth 1960, in the clinic at [facility]. Herb Delgado MD is the patient's PCP. Lucretia Bean has an outstanding lab/procedure at the time we reviewed her chart. In order to help keep her health information updated, she has authorized us to request the following medical record(s):        ( X )  MAMMOGRAM                                      (  )  COLONOSCOPY      (  )  PAP SMEAR                                          (  )  OUTSIDE LAB RESULTS     (  )  DEXA SCAN                                          (  )  EYE EXAM            (  )  FOOT EXAM                                          (  )  ENTIRE RECORD     (  )  OUTSIDE IMMUNIZATIONS                 (  )  _______________         Please fax records to [facility], Bergeaux, Scott J, MD, [phone] or [phone].       If you have any questions you can contact Mariella Dante at [phone].           Patient Name: Lucretia Bean  : 1960  Patient Phone #: [phone]

## 2024-04-08 NOTE — PROGRESS NOTES
Health Maintenance Topic(s) Outreach Outcomes & Actions Taken:    Breast Cancer Screening - Outreach Outcomes & Actions Taken  : External Records Requested & Care Team Updated if Applicable and NADINE sent to RADU    Cervical Cancer Screening - Outreach Outcomes & Actions Taken  : External Records Requested & Care Team Updated if Applicable and NADINE sent to Dr. Culp       Additional Notes:

## 2024-04-09 NOTE — PROGRESS NOTES
I received a fax back from Dr. Culp office with pap smear from 9/26/22. Record has already been scanned in.

## 2024-04-11 NOTE — PROGRESS NOTES
Health Maintenance Topic(s) Outreach Outcomes & Actions Taken:    Breast Cancer Screening - Outreach Outcomes & Actions Taken  : External Records Uploaded & Care Team Updated if Applicable       Additional Notes:  Mammogram 2/15/2024

## 2024-06-27 ENCOUNTER — PATIENT MESSAGE (OUTPATIENT)
Dept: NEUROLOGY | Facility: CLINIC | Age: 64
End: 2024-06-27
Payer: MEDICARE

## 2024-07-19 ENCOUNTER — PATIENT MESSAGE (OUTPATIENT)
Dept: NEUROLOGY | Facility: CLINIC | Age: 64
End: 2024-07-19
Payer: MEDICARE

## 2024-07-23 ENCOUNTER — OFFICE VISIT (OUTPATIENT)
Dept: NEUROLOGY | Facility: CLINIC | Age: 64
End: 2024-07-23
Payer: MEDICARE

## 2024-07-23 VITALS
DIASTOLIC BLOOD PRESSURE: 64 MMHG | RESPIRATION RATE: 20 BRPM | BODY MASS INDEX: 20.13 KG/M2 | SYSTOLIC BLOOD PRESSURE: 98 MMHG | WEIGHT: 87 LBS | HEIGHT: 55 IN

## 2024-07-23 DIAGNOSIS — R41.3 MEMORY LOSS: ICD-10-CM

## 2024-07-23 DIAGNOSIS — Z87.820 H/O TRAUMATIC BRAIN INJURY: ICD-10-CM

## 2024-07-23 DIAGNOSIS — F09 COGNITIVE DYSFUNCTION: ICD-10-CM

## 2024-07-23 DIAGNOSIS — G47.33 OSA (OBSTRUCTIVE SLEEP APNEA): Primary | ICD-10-CM

## 2024-07-23 PROCEDURE — 99214 OFFICE O/P EST MOD 30 MIN: CPT | Mod: S$PBB,,, | Performed by: NURSE PRACTITIONER

## 2024-07-23 PROCEDURE — 99999 PR PBB SHADOW E&M-EST. PATIENT-LVL IV: CPT | Mod: PBBFAC,,, | Performed by: NURSE PRACTITIONER

## 2024-07-23 PROCEDURE — 99214 OFFICE O/P EST MOD 30 MIN: CPT | Mod: PBBFAC | Performed by: NURSE PRACTITIONER

## 2024-07-23 NOTE — PROGRESS NOTES
Neurology Follow up Note    Subjective:         Patient ID: Lucretia Bean is a 64 y.o. female.    Chief Complaint: CHENTE and memory loss/cog impairment     HPI:              Cont to feel PAP therapy is beneficial; feels refreshed when awakening; denies EDS  Denies issues with mask/machine    PAP data:  date range 06/21/2024-07/20/2024  days used >=4hr 29/30  AHI 17  Auto 6-15 cm         4/1/2024     9:05 AM   EPWORTH SLEEPINESS SCALE   Sitting and reading 0   Watching TV 0   Sitting, inactive in a public place (e.g. a theatre or a meeting) 0   As a passenger in a car for an hour without a break 0   Lying down to rest in the afternoon when circumstances permit 2   Sitting and talking to someone 0   Sitting quietly after a lunch without alcohol 1   In a car, while stopped for a few minutes in traffic 0   Total score 3       Continues with cognitive impairment; she lives with her niece. She does not drive. Requires assistance with some ADL's.       ROS: as per HPI, otherwise pertinent systems review is negative          Past Medical History:   Diagnosis Date    Carbohydrate intolerance     Depression     Essential (primary) hypertension     Mixed hyperlipidemia     Type 2 diabetes mellitus without complications     Weight loss of more than 10% body weight     reports wt loss of 1/2 of body weight       Past Surgical History:   Procedure Laterality Date    TUBAL LIGATION         Family History   Problem Relation Name Age of Onset    Arthritis Mother      Thyroid disease Mother         Social History     Socioeconomic History    Marital status: Single   Tobacco Use    Smoking status: Never    Smokeless tobacco: Never   Substance and Sexual Activity    Alcohol use: Never    Drug use: Never    Sexual activity: Not Currently   Social History Narrative    ** Merged History Encounter **          Social Determinants of Health     Financial Resource Strain: Low Risk  (10/2/2023)    Overall Financial Resource Strain ([facility])  "    Difficulty of Paying Living Expenses: Not hard at all   Food Insecurity: No Food Insecurity (10/2/2023)    Hunger Vital Sign     Worried About Running Out of Food in the Last Year: Never true     Ran Out of Food in the Last Year: Never true   Transportation Needs: No Transportation Needs (10/2/2023)    PRAPARE - Transportation     Lack of Transportation (Medical): No     Lack of Transportation (Non-Medical): No   Physical Activity: Sufficiently Active (10/2/2023)    Exercise Vital Sign     Days of Exercise per Week: 5 days     Minutes of Exercise per Session: 30 min   Stress: No Stress Concern Present (10/2/2023)    [employer] [employer] - Occupational Stress Questionnaire     Feeling of Stress : Not at all   Housing Stability: Unknown (10/2/2023)    Housing Stability Vital Sign     Unable to Pay for Housing in the Last Year: No     Unstable Housing in the Last Year: No       Review of patient's allergies indicates:  No Known Allergies    Current Outpatient Medications   Medication Instructions    alendronate (FOSAMAX) 70 mg, Oral, Every 7 days    calcium carbonate-vitamin D3 1,000 mg-20 mcg (800 unit) Tab 1 tablet, Oral, Once    lisinopriL-hydrochlorothiazide (PRINZIDE,ZESTORETIC) 20-12.5 mg per tablet 1 tablet, Oral    metFORMIN (GLUCOPHAGE) 1,000 mg, Oral, 2 times daily    multivitamin (ONE DAILY MULTIVITAMIN) per tablet 1 tablet, Oral, Daily    pioglitazone (ACTOS) 30 mg, Oral, Daily    rosuvastatin (CRESTOR) 40 mg, Oral, Daily       Objective:      Exam:   Visit Vitals  BP 98/64 (BP Location: Left arm, Patient Position: Sitting, BP Method: Medium (Automatic))   Resp 20   Ht 4' 7" (1.397 m)   Wt 39.5 kg (87 lb)   BMI 20.22 kg/m²       Physical Exam  Vitals reviewed.   Constitutional:       Appearance: Thin      Accompanied by: niece in law (Lashawn) is here today  HENT:      Ears:      Comments: Hearing normal.  Eyes:      Extraocular Movements: Extraocular movements intact.      VF's " ok  Musculoskeletal:         General: Normal range of motion.   Skin:     General: Skin is warm and dry.   Neurological:      General: No focal deficit present.      Mental Status: alert; cog impairment MMSE 20/30     Speech: nml     Face: symmetric; tongue midline     Motor: nonlateralizing     Gait: unassisted and normal.  Psychiatric:         Mood and Affect: Mood normal.         Behavior: Behavior normal.         Assessment/Plan:   1. CHENTE (obstructive sleep apnea)  2. Memory loss  3. Cognitive complaints  4. H/o TBI    Patient is benefiting from PAP therapy; Encouraged continued use of PAP. Drowsy driving may still occur despite PAP use. Clinical follow up and replacement of supplies discussed.    Autopap setting change to 5-20 cm given the elevated AHI; if change not effective at bringing down the AHI, will suggest PAP titration night as she may need bipap.    Download in 2 weeks    Referral to Gema Salgado - psychology for neuropsych evaluation.   We collectively decided to hold off on adding Memantine or Aricept at this time.     DME:[facility] in 3 months           Wes Bello, MSN, APRN, AGACNP-BC

## 2024-09-06 DIAGNOSIS — I10 HYPERTENSION, UNSPECIFIED TYPE: ICD-10-CM

## 2024-09-06 RX ORDER — LISINOPRIL AND HYDROCHLOROTHIAZIDE 12.5; 2 MG/1; MG/1
1 TABLET ORAL
Qty: 90 TABLET | Refills: 3 | Status: SHIPPED | OUTPATIENT
Start: 2024-09-06

## 2024-09-30 DIAGNOSIS — Z11.59 NEED FOR HEPATITIS C SCREENING TEST: ICD-10-CM

## 2024-09-30 DIAGNOSIS — Z00.00 WELLNESS EXAMINATION: ICD-10-CM

## 2024-09-30 DIAGNOSIS — I10 HYPERTENSION, UNSPECIFIED TYPE: Primary | ICD-10-CM

## 2024-09-30 DIAGNOSIS — Z11.4 ENCOUNTER FOR HIV (HUMAN IMMUNODEFICIENCY VIRUS) TEST: ICD-10-CM

## 2024-09-30 DIAGNOSIS — E78.5 HYPERLIPIDEMIA, UNSPECIFIED HYPERLIPIDEMIA TYPE: ICD-10-CM

## 2024-09-30 DIAGNOSIS — E11.9 TYPE 2 DIABETES MELLITUS WITHOUT COMPLICATION, WITHOUT LONG-TERM CURRENT USE OF INSULIN: ICD-10-CM

## 2024-10-21 ENCOUNTER — PATIENT MESSAGE (OUTPATIENT)
Dept: NEUROLOGY | Facility: CLINIC | Age: 64
End: 2024-10-21
Payer: MEDICARE

## 2024-10-21 ENCOUNTER — LAB VISIT (OUTPATIENT)
Dept: LAB | Facility: HOSPITAL | Age: 64
End: 2024-10-21
Attending: FAMILY MEDICINE
Payer: MEDICARE

## 2024-10-21 DIAGNOSIS — E78.5 HYPERLIPIDEMIA, UNSPECIFIED HYPERLIPIDEMIA TYPE: ICD-10-CM

## 2024-10-21 DIAGNOSIS — F09 COGNITIVE DYSFUNCTION: ICD-10-CM

## 2024-10-21 DIAGNOSIS — Z11.4 ENCOUNTER FOR HIV (HUMAN IMMUNODEFICIENCY VIRUS) TEST: ICD-10-CM

## 2024-10-21 DIAGNOSIS — Z11.59 NEED FOR HEPATITIS C SCREENING TEST: ICD-10-CM

## 2024-10-21 DIAGNOSIS — R41.3 MEMORY LOSS: ICD-10-CM

## 2024-10-21 DIAGNOSIS — Z00.00 WELLNESS EXAMINATION: ICD-10-CM

## 2024-10-21 DIAGNOSIS — E11.9 TYPE 2 DIABETES MELLITUS WITHOUT COMPLICATION, WITHOUT LONG-TERM CURRENT USE OF INSULIN: ICD-10-CM

## 2024-10-21 DIAGNOSIS — I10 HYPERTENSION, UNSPECIFIED TYPE: ICD-10-CM

## 2024-10-21 LAB
ALBUMIN SERPL-MCNC: 4.3 G/DL (ref 3.4–4.8)
ALBUMIN/GLOB SERPL: 1.7 RATIO (ref 1.1–2)
ALP SERPL-CCNC: 39 UNIT/L (ref 40–150)
ALT SERPL-CCNC: 16 UNIT/L (ref 0–55)
ANION GAP SERPL CALC-SCNC: 11 MEQ/L
AST SERPL-CCNC: 16 UNIT/L (ref 5–34)
BACTERIA #/AREA URNS AUTO: ABNORMAL /HPF
BASOPHILS # BLD AUTO: 0.05 X10(3)/MCL
BASOPHILS NFR BLD AUTO: 0.9 %
BILIRUB SERPL-MCNC: 0.2 MG/DL
BILIRUB UR QL STRIP.AUTO: NEGATIVE
BUN SERPL-MCNC: 31.6 MG/DL (ref 9.8–20.1)
CALCIUM SERPL-MCNC: 9.5 MG/DL (ref 8.4–10.2)
CHLORIDE SERPL-SCNC: 111 MMOL/L (ref 98–107)
CHOLEST SERPL-MCNC: 172 MG/DL
CHOLEST/HDLC SERPL: 3 {RATIO} (ref 0–5)
CLARITY UR: CLEAR
CO2 SERPL-SCNC: 24 MMOL/L (ref 23–31)
COLOR UR AUTO: ABNORMAL
CREAT SERPL-MCNC: 0.89 MG/DL (ref 0.55–1.02)
CREAT UR-MCNC: 95.2 MG/DL (ref 45–106)
CREAT/UREA NIT SERPL: 36
EOSINOPHIL # BLD AUTO: 0.09 X10(3)/MCL (ref 0–0.9)
EOSINOPHIL NFR BLD AUTO: 1.6 %
ERYTHROCYTE [DISTWIDTH] IN BLOOD BY AUTOMATED COUNT: 15.1 % (ref 11.5–17)
EST. AVERAGE GLUCOSE BLD GHB EST-MCNC: 116.9 MG/DL
GFR SERPLBLD CREATININE-BSD FMLA CKD-EPI: >60 ML/MIN/1.73/M2
GLOBULIN SER-MCNC: 2.5 GM/DL (ref 2.4–3.5)
GLUCOSE SERPL-MCNC: 115 MG/DL (ref 82–115)
GLUCOSE UR QL STRIP: NORMAL
HBA1C MFR BLD: 5.7 %
HCT VFR BLD AUTO: 33.2 % (ref 37–47)
HCV AB SERPL QL IA: NONREACTIVE
HDLC SERPL-MCNC: 61 MG/DL (ref 35–60)
HGB BLD-MCNC: 10.2 G/DL (ref 12–16)
HGB UR QL STRIP: NEGATIVE
HIV 1+2 AB+HIV1 P24 AG SERPL QL IA: NONREACTIVE
IMM GRANULOCYTES # BLD AUTO: 0.01 X10(3)/MCL (ref 0–0.04)
IMM GRANULOCYTES NFR BLD AUTO: 0.2 %
KETONES UR QL STRIP: NEGATIVE
LDLC SERPL CALC-MCNC: 90 MG/DL (ref 50–140)
LEUKOCYTE ESTERASE UR QL STRIP: NEGATIVE
LYMPHOCYTES # BLD AUTO: 1.66 X10(3)/MCL (ref 0.6–4.6)
LYMPHOCYTES NFR BLD AUTO: 28.6 %
MCH RBC QN AUTO: 29 PG (ref 27–31)
MCHC RBC AUTO-ENTMCNC: 30.7 G/DL (ref 33–36)
MCV RBC AUTO: 94.3 FL (ref 80–94)
MICROALBUMIN UR-MCNC: 12.9 UG/ML
MICROALBUMIN/CREAT RATIO PNL UR: 13.6 MG/GM CR (ref 0–30)
MONOCYTES # BLD AUTO: 0.51 X10(3)/MCL (ref 0.1–1.3)
MONOCYTES NFR BLD AUTO: 8.8 %
MUCOUS THREADS URNS QL MICRO: ABNORMAL /LPF
NEUTROPHILS # BLD AUTO: 3.48 X10(3)/MCL (ref 2.1–9.2)
NEUTROPHILS NFR BLD AUTO: 59.9 %
NITRITE UR QL STRIP: NEGATIVE
NRBC BLD AUTO-RTO: 0 %
PH UR STRIP: 5.5 [PH]
PLATELET # BLD AUTO: 342 X10(3)/MCL (ref 130–400)
PMV BLD AUTO: 10.3 FL (ref 7.4–10.4)
POTASSIUM SERPL-SCNC: 3.8 MMOL/L (ref 3.5–5.1)
PROT SERPL-MCNC: 6.8 GM/DL (ref 5.8–7.6)
PROT UR QL STRIP: NEGATIVE
RBC # BLD AUTO: 3.52 X10(6)/MCL (ref 4.2–5.4)
RBC #/AREA URNS AUTO: ABNORMAL /HPF
SODIUM SERPL-SCNC: 146 MMOL/L (ref 136–145)
SP GR UR STRIP.AUTO: 1.02 (ref 1–1.03)
SQUAMOUS #/AREA URNS LPF: ABNORMAL /HPF
TRIGL SERPL-MCNC: 107 MG/DL (ref 37–140)
UROBILINOGEN UR STRIP-ACNC: NORMAL
VIT B12 SERPL-MCNC: 764 PG/ML (ref 213–816)
VLDLC SERPL CALC-MCNC: 21 MG/DL
WBC # BLD AUTO: 5.8 X10(3)/MCL (ref 4.5–11.5)
WBC #/AREA URNS AUTO: ABNORMAL /HPF

## 2024-10-21 PROCEDURE — 83036 HEMOGLOBIN GLYCOSYLATED A1C: CPT

## 2024-10-21 PROCEDURE — 82043 UR ALBUMIN QUANTITATIVE: CPT

## 2024-10-21 PROCEDURE — 86803 HEPATITIS C AB TEST: CPT

## 2024-10-21 PROCEDURE — 36415 COLL VENOUS BLD VENIPUNCTURE: CPT

## 2024-10-21 PROCEDURE — 82607 VITAMIN B-12: CPT

## 2024-10-21 PROCEDURE — 85025 COMPLETE CBC W/AUTO DIFF WBC: CPT

## 2024-10-21 PROCEDURE — 81001 URINALYSIS AUTO W/SCOPE: CPT

## 2024-10-21 PROCEDURE — 82570 ASSAY OF URINE CREATININE: CPT

## 2024-10-21 PROCEDURE — 80053 COMPREHEN METABOLIC PANEL: CPT

## 2024-10-21 PROCEDURE — 87389 HIV-1 AG W/HIV-1&-2 AB AG IA: CPT

## 2024-10-21 PROCEDURE — 80061 LIPID PANEL: CPT

## 2024-11-01 ENCOUNTER — PATIENT MESSAGE (OUTPATIENT)
Dept: NEUROLOGY | Facility: CLINIC | Age: 64
End: 2024-11-01
Payer: MEDICARE

## 2024-11-04 ENCOUNTER — OFFICE VISIT (OUTPATIENT)
Dept: NEUROLOGY | Facility: CLINIC | Age: 64
End: 2024-11-04
Payer: MEDICARE

## 2024-11-04 VITALS
SYSTOLIC BLOOD PRESSURE: 104 MMHG | DIASTOLIC BLOOD PRESSURE: 68 MMHG | HEIGHT: 55 IN | WEIGHT: 87 LBS | BODY MASS INDEX: 20.13 KG/M2

## 2024-11-04 DIAGNOSIS — Z87.820 H/O TRAUMATIC BRAIN INJURY: ICD-10-CM

## 2024-11-04 DIAGNOSIS — G47.33 OSA (OBSTRUCTIVE SLEEP APNEA): Primary | ICD-10-CM

## 2024-11-04 PROCEDURE — 99214 OFFICE O/P EST MOD 30 MIN: CPT | Mod: S$PBB,,, | Performed by: NURSE PRACTITIONER

## 2024-11-04 PROCEDURE — 99999 PR PBB SHADOW E&M-EST. PATIENT-LVL III: CPT | Mod: PBBFAC,,, | Performed by: NURSE PRACTITIONER

## 2024-11-04 PROCEDURE — 99213 OFFICE O/P EST LOW 20 MIN: CPT | Mod: PBBFAC | Performed by: NURSE PRACTITIONER

## 2024-11-04 NOTE — PROGRESS NOTES
Neurology Follow up Note    Subjective:         Patient ID: Lucretia Bean is a 64 y.o. female.    Chief Complaint: CHENTE    HPI:            Wears CPAP qhs and is tolerating it well. Sleeps better w CPAP. Sleeps 10-11 hrs a night and awakens 2 times due to nocturia and is able to go right back to sleep. Awakens refreshed and denies EDS. Does not nap. Changes mask and tuing on a regular basis. Denies any issues w mask or equipment.     DME [facility].       Compliance 10/02/2024-10/31/2024    >=4 hrs  30/30  100%  AHI-25.8    Auto 5-20    ROS: as per HPI, otherwise pertinent systems review is negative          Past Medical History:   Diagnosis Date    Carbohydrate intolerance     Depression     Essential (primary) hypertension     Mixed hyperlipidemia     Type 2 diabetes mellitus without complications     Weight loss of more than 10% body weight     reports wt loss of 1/2 of body weight       Past Surgical History:   Procedure Laterality Date    TUBAL LIGATION         Family History   Problem Relation Name Age of Onset    Arthritis Mother      Thyroid disease Mother         Social History     Socioeconomic History    Marital status: Single   Tobacco Use    Smoking status: Never    Smokeless tobacco: Never   Substance and Sexual Activity    Alcohol use: Never    Drug use: Never    Sexual activity: Not Currently   Social History Narrative    ** Merged History Encounter **          Social Drivers of Health     Financial Resource Strain: Low Risk  (10/2/2023)    Overall Financial Resource Strain ([facility])     Difficulty of Paying Living Expenses: Not hard at all   Food Insecurity: No Food Insecurity (10/2/2023)    Hunger Vital Sign     Worried About Running Out of Food in the Last Year: Never true     Ran Out of Food in the Last Year: Never true   Transportation Needs: No Transportation Needs (10/2/2023)    PRAPARE - Transportation     Lack of Transportation (Medical): No     Lack of Transportation (Non-Medical): No  "  Physical Activity: Sufficiently Active (10/2/2023)    Exercise Vital Sign     Days of Exercise per Week: 5 days     Minutes of Exercise per Session: 30 min   Stress: No Stress Concern Present (10/2/2023)    [employer]izen [employer] [place] [employer] - Occupational Stress Questionnaire     Feeling of Stress : Not at all   Housing Stability: Unknown (10/2/2023)    Housing Stability Vital Sign     Unable to Pay for Housing in the Last Year: No     Unstable Housing in the Last Year: No       Review of patient's allergies indicates:  No Known Allergies    Current Outpatient Medications   Medication Instructions    alendronate (FOSAMAX) 70 mg, Oral, Every 7 days    calcium carbonate-vitamin D3 1,000 mg-20 mcg (800 unit) Tab 1 tablet, Oral, Once    lisinopriL-hydrochlorothiazide (PRINZIDE,ZESTORETIC) 20-12.5 mg per tablet 1 tablet, Oral    metFORMIN (GLUCOPHAGE) 1,000 mg, 2 times daily    multivitamin (ONE DAILY MULTIVITAMIN) per tablet 1 tablet, Daily    pioglitazone (ACTOS) 30 mg, Daily    rosuvastatin (CRESTOR) 40 mg, Daily       Objective:      Exam:   Visit Vitals  /68 (BP Location: Left arm, Patient Position: Sitting)   Ht 4' 7" (1.397 m)   Wt 39.5 kg (87 lb)   BMI 20.22 kg/m²       Physical Exam  Vitals reviewed.   Constitutional:       Appearance: Thin      Accompanied by: niece in law (Lashawn) is here today  HENT:      Ears:      Comments: Hearing normal.  Eyes:      Extraocular Movements: Extraocular movements intact.      VF's ok  Musculoskeletal:         General: Normal range of motion.   Skin:     General: Skin is warm and dry.   Neurological:      General: No focal deficit present.      Mental Status: alert; cog impairment      Speech: nml     Face: symmetric; tongue midline     Motor: nonlateralizing     Gait: unassisted and normal.  Psychiatric:         Mood and Affect: Mood normal.         Behavior: Behavior normal.         Assessment/Plan:   1. CHENTE (obstructive sleep apnea) (Primary)  2. H/O " traumatic brain injury    Encouraged continued use of PAP. Drowsy driving may still occur despite PAP use. Clinical follow up and replacement of supplies discussed.    AHI even higher today than last time we visited despite the change to autopap. Will get OPO to ascertain O2 level while utilizing PAP.    Considered/discussed PAP titration night-will decided once the OPO done    Repeat MMSE at next OV     DME:[facility]    Follow up in about 3 months (around 2/4/2025).      Wes Bello, MSN, APRN, AGACNP-BC

## 2024-11-20 ENCOUNTER — OFFICE VISIT (OUTPATIENT)
Dept: FAMILY MEDICINE | Facility: CLINIC | Age: 64
End: 2024-11-20
Payer: MEDICARE

## 2024-11-20 VITALS
BODY MASS INDEX: 19.67 KG/M2 | DIASTOLIC BLOOD PRESSURE: 58 MMHG | TEMPERATURE: 97 F | WEIGHT: 85 LBS | RESPIRATION RATE: 16 BRPM | HEIGHT: 55 IN | OXYGEN SATURATION: 98 % | SYSTOLIC BLOOD PRESSURE: 90 MMHG | HEART RATE: 73 BPM

## 2024-11-20 DIAGNOSIS — Z00.00 WELLNESS EXAMINATION: Primary | ICD-10-CM

## 2024-11-20 DIAGNOSIS — Z12.31 SCREENING MAMMOGRAM, ENCOUNTER FOR: ICD-10-CM

## 2024-11-20 PROCEDURE — G0439 PPPS, SUBSEQ VISIT: HCPCS | Mod: ,,, | Performed by: FAMILY MEDICINE
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
## 2024-11-20 NOTE — ASSESSMENT & PLAN NOTE
Lab work reviewed with patient  Continue current medication  Continue diet/exercise  Advanced directive discussed with patient  Return to clinic with any concerns    Advance Care Planning     Date: 11/20/2024    Living Will  During this visit, I engaged the patient  in the voluntary advance care planning process.  The patient and I reviewed the role for advance directives and their purpose in directing future healthcare if the patient's unable to speak for him/herself.  At this point in time, the patient does have full decision-making capacity.  We discussed different extreme health states that she could experience, and reviewed what kind of medical care she would want in those situations.  The patient communicated that if she were comatose and had little chance of a meaningful recovery, she would not want machines/life-sustaining treatments used.  I spent a total of 5 minutes engaging the patient in this advance care planning discussion.

## 2025-02-11 ENCOUNTER — OFFICE VISIT (OUTPATIENT)
Dept: NEUROLOGY | Facility: CLINIC | Age: 65
End: 2025-02-11
Payer: MEDICARE

## 2025-02-11 VITALS
HEIGHT: 55 IN | WEIGHT: 85 LBS | BODY MASS INDEX: 19.67 KG/M2 | SYSTOLIC BLOOD PRESSURE: 108 MMHG | DIASTOLIC BLOOD PRESSURE: 62 MMHG

## 2025-02-11 DIAGNOSIS — G47.33 OSA (OBSTRUCTIVE SLEEP APNEA): Primary | ICD-10-CM

## 2025-02-11 DIAGNOSIS — R41.3 MEMORY LOSS: ICD-10-CM

## 2025-02-11 DIAGNOSIS — Z87.820 H/O TRAUMATIC BRAIN INJURY: ICD-10-CM

## 2025-02-11 PROCEDURE — 99999 PR PBB SHADOW E&M-EST. PATIENT-LVL III: CPT | Mod: PBBFAC,,, | Performed by: NURSE PRACTITIONER

## 2025-02-11 PROCEDURE — 99214 OFFICE O/P EST MOD 30 MIN: CPT | Mod: S$PBB,,, | Performed by: NURSE PRACTITIONER

## 2025-02-11 PROCEDURE — 99213 OFFICE O/P EST LOW 20 MIN: CPT | Mod: PBBFAC | Performed by: NURSE PRACTITIONER
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
## 2025-05-21 ENCOUNTER — RESULTS FOLLOW-UP (OUTPATIENT)
Dept: FAMILY MEDICINE | Facility: CLINIC | Age: 65
End: 2025-05-21

## 2025-05-21 ENCOUNTER — TELEPHONE (OUTPATIENT)
Dept: FAMILY MEDICINE | Facility: CLINIC | Age: 65
End: 2025-05-21

## 2025-05-21 ENCOUNTER — CLINICAL SUPPORT (OUTPATIENT)
Dept: FAMILY MEDICINE | Facility: CLINIC | Age: 65
End: 2025-05-21
Payer: MEDICARE

## 2025-05-21 ENCOUNTER — PATIENT OUTREACH (OUTPATIENT)
Facility: CLINIC | Age: 65
End: 2025-05-21
Payer: MEDICARE

## 2025-05-21 ENCOUNTER — OFFICE VISIT (OUTPATIENT)
Dept: FAMILY MEDICINE | Facility: CLINIC | Age: 65
End: 2025-05-21
Payer: MEDICARE

## 2025-05-21 VITALS
BODY MASS INDEX: 18.83 KG/M2 | SYSTOLIC BLOOD PRESSURE: 102 MMHG | DIASTOLIC BLOOD PRESSURE: 62 MMHG | RESPIRATION RATE: 18 BRPM | TEMPERATURE: 97 F | OXYGEN SATURATION: 99 % | HEIGHT: 55 IN | WEIGHT: 81.38 LBS | HEART RATE: 78 BPM

## 2025-05-21 DIAGNOSIS — E11.9 TYPE 2 DIABETES MELLITUS WITHOUT COMPLICATION, WITHOUT LONG-TERM CURRENT USE OF INSULIN: ICD-10-CM

## 2025-05-21 DIAGNOSIS — M81.0 AGE-RELATED OSTEOPOROSIS WITHOUT CURRENT PATHOLOGICAL FRACTURE: ICD-10-CM

## 2025-05-21 DIAGNOSIS — E78.5 HYPERLIPIDEMIA, UNSPECIFIED HYPERLIPIDEMIA TYPE: ICD-10-CM

## 2025-05-21 DIAGNOSIS — E11.42 TYPE 2 DIABETES MELLITUS WITH DIABETIC POLYNEUROPATHY, WITHOUT LONG-TERM CURRENT USE OF INSULIN: Primary | ICD-10-CM

## 2025-05-21 DIAGNOSIS — Z12.31 BREAST CANCER SCREENING BY MAMMOGRAM: ICD-10-CM

## 2025-05-21 DIAGNOSIS — I10 HYPERTENSION, UNSPECIFIED TYPE: ICD-10-CM

## 2025-05-21 DIAGNOSIS — Z12.4 CERVICAL CANCER SCREENING: ICD-10-CM

## 2025-05-21 RX ORDER — LISINOPRIL AND HYDROCHLOROTHIAZIDE 12.5; 2 MG/1; MG/1
1 TABLET ORAL DAILY
Qty: 90 TABLET | Refills: 1 | Status: SHIPPED | OUTPATIENT
Start: 2025-05-21

## 2025-05-21 RX ORDER — ROSUVASTATIN CALCIUM 40 MG/1
40 TABLET, COATED ORAL DAILY
Qty: 90 TABLET | Refills: 3 | Status: SHIPPED | OUTPATIENT
Start: 2025-05-21

## 2025-05-21 NOTE — ASSESSMENT & PLAN NOTE
Controlled  Continue metformin 1000 mg by mouth daily + Actos 30 mg by mouth daily  On ACE and Statin according to guidelines  Diabetic foot exam performed  Eye exam performed in clinic  Discussed importance of healthy lifestyle changes including diet and exercise; follow ADA diet  Return to clinic with any concerns   Continue follow-ups with endocrinology, Dr. Gabriel

## 2025-05-21 NOTE — LETTER
AUTHORIZATION FOR RELEASE OF CONFIDENTIAL INFORMATION      2025      Dear Dr Sid Culp,    We are seeing Lucretia Bean, date of birth 1960, in the clinic at [facility].  Herb Delgado MD is the patient's PCP. Lucretia Bean has an outstanding lab/procedure at the time we reviewed his chart.  In order to help keep her health information updated, Lucretia has authorized us to request the following medical record(s):        Pap Smear           Please fax any records to Herb Delgado MD's at  [phone]    If you have any questions, please contact  Rodney MCCORD CCC @ [phone]             Patient Name: Lucretia Bean  :1960  Patient Phone #:[phone]                Lucretia Bean  MRN: [id number]  : 1960  Age: 64 y.o.  Sex: female         Patient/Legal Guardian Signature  This signature was collected at 02/10/2025           _______________________________   Printed Name/Relationship to Patient      Consent for Examination and Treatment: I hereby authorize the providers and employees of [facility] ([facility]) to provide medical treatment/services which includes, but is not limited to, performing and administering tests and diagnostic procedures that are deemed necessary, including, but not limited to, imaging examinations, blood tests and other laboratory procedures as may be required by the hospital, clinic, or may be ordered by my physician(s) or persons working under the general and/or special instructions of my physician(s).      I understand and agree that this consent covers all authorized persons, including but not limited to physicians, residents, nurse practitioners, physicians' assistants, specialists, consultants, student nurses, and independently contracted physicians, who are called upon by the physician in charge, to carry out the diagnostic procedures and medical or surgical treatment.     I hereby authorize [facility] to retain or  dispose of any specimens or tissue, should there be such remaining from any test or procedure.     I hereby authorize and give consent for [facility] providers and employees to take photographs, images or videotapes of such diagnostic, surgical or treatment procedures of Patient as may be required by [facility] or as may be ordered by a physician. I further acknowledge and agree that [facility] may use cameras or other devices for patient monitoring.     I am aware that the practice of medicine is not an exact science, and I acknowledge that no guarantees have been made to me as to the outcome of any tests, procedures or treatment.     Authorization for Release of Information: I understand that my insurance company and/or their agents may need information necessary to make determinations about payment/reimbursement. I hereby provide authorization to release to all insurance companies, their successors, assignees, other parties with whom they may have contracted, or others acting on their behalf, that are involved with payment for any hospital and/or clinic charges incurred by the patient, any information that they request and deem necessary for payment/reimbursement, and/or quality review.  I further authorize the release of my health information to physicians or other health care practitioners on staff who are involved in my health care now and in the future, and to other health care providers, entities, or institutions for the purpose of my continued care and treatment, including referrals.     REGISTRATION AUTHORIZATION  Form No. 24663 (Rev. 3/25/2024)    Page 1 of 3                       Medicare Patient's Certification and Authorization to Release Information and Payment Request:  I certify that the information given by me in applying for payment under Title XVIII of the Social Security Act is correct. I authorize any andrade of medical or other information about me to release to the Social SecurityAdministration, or its  intermediaries or carriers, any information needed for this or a related Medicare claim. I request that payment of authorized benefits be made on my behalf.     Assignment of Insurance Benefits:   I hereby authorize any and all insurance companies, health plans, defined   benefit plans, health insurers or any entity that is or may be responsible for payment of my medical expenses to pay all hospital and medical benefits now due, and to become due and payable to me under any hospital benefits, sick benefits, injury benefits or any other benefit for services rendered to me, including Major Medical Benefits, direct to [facility] and all independently contracted physicians. I assign any and all rights that I may have against any and all insurance companies, health plans, defined benefit plans, health insurers or any entity that is or may be responsible for payment of my medical expenses, including, but not limited to any right to appeal a denial of a claim, any right to bring any action, lawsuit, administrative proceeding, or other cause of action on my behalf. I specifically assign my right to pursue litigation against any and all insurance companies, health plans, defined benefit plans, health insurers or any entity that is or may be responsible for payment of my medical expenses based upon a refusal to pay charges.            E. Valuables: It is understood and agreed that [facility] is not liable for the damage to or loss of any money, jewelry,   documents, dentures, eye glasses, hearing aids, prosthetics, or other property of value.     F. Computer Equipment: I understand and agree that should I choose to use computer equipment owned by [facility] or if I choose to access the Internet via [facility]s network, I do so at my own risk. [facility] is not responsible for any damage to my computer equipment or to any damages of any type that might arise from my loss of equipment or data.     G. Acceptance of Financial Responsibility:   I agree that in consideration of the services and   supplies that have been   or will be furnished to the patient, I am hereby obligated to pay all charges made for or on the account of the patient according to the standard rates (in effect at the time the services and supplies are delivered) established by [facility], including its Patient Financial Assistance Policy to the extent it is applicable. I understand that I am responsible for all charges, or portions thereof, not covered by insurance or other sources. Patient refunds will be distributed only after balances at all [facility] facilities are paid.     H. Communication Authorization:  I hereby authorize [facility] and its representatives, along with any billing service   or  who may work on their behalf, to contact me on   my cell phone and/or home phone using pre- recorded messages, artificial voice messages, automatic telephone dialing devices or other computer assisted technology, or by electronic      mail, text messaging, or by any other form of electronic communication. This includes, but is not limited to, appointment reminders, yearly physical exam reminders, preventive care reminders, patient campaigns, welcome calls, and calls about account balances on my account or any account on which I am listed as a guarantor. I understand I have the right to opt out of these communications at any time.      Relationship  Between  Facility and  Provider:      I understand that some, but not all, providers furnishing services to the patient are not employees or agents of [facility]. The patient is under the care and supervision of his/her attending physician, and it is the responsibility of the facility and its nursing staff to carry out the instructions of such physicians. It is the responsibility of the patient's physician/designee to obtain the patient's informed consent, when required, for medical or surgical treatment, special diagnostic or therapeutic  procedures, or hospital services rendered for the patient under the special instructions of the physician/designee.           REGISTRATION AUTHORIZATION  Form No. 43987 (Rev. 3/25/2024)    Page 2 of 3                       Immunizations: [facility] shares immunization information with state sponsored health departments to help you and your doctor keep track of your immunization records. By signing, you consent to have this information shared with the health department in your state:                                [place] - LINKS ([place] Immunization Network for Kids Statewide)                                [place] - MIIX ([place] Immunization Information eXchange)                                [place] - ImmPRINT (Immunization Patient Registry with Integrated Technology)     TERM: This authorization is valid for this and subsequent care/treatment I receive at [facility] and will remain valid unless/until revoked in writing by me.     [facility]: As used in this document, [facility] means all [facility] owned and managed facilities, including, but not limited to, all health centers, surgery centers, clinics, urgent care centers, and hospitals.         [facility] complies with applicable Federal civil rights laws and does not discriminate on the basis of race, color, national origin, age, disability, or sex.  ATENCIÓN: si habla randimurray, tiene a hollis disposición servicios gratuitos de asistencia lingüística. Steven al [phone].  CHÚ Ý: N?u b?n nói Ti?ng Vi?t, có các d?ch v? h? tr? ngôn ng? mi?n phí dành cho b?n. G?i s? [phone].        REGISTRATION AUTHORIZATION  Form No. 78715 (Rev. 3/25/2024)   Page 3 of 3

## 2025-05-21 NOTE — ASSESSMENT & PLAN NOTE
Last date of DEXA scan unknown, completed with gyn, records requested.  Continue Calcium-vitamin D3 1000mg-20 mcg by mouth daily  Continue Fosamax 70 mg by mouth every 7 days  Weight bearing exercise such as walking or resistance training 5 times/week.  Avoid falls by assessing home for loose cords and rugs, wearing proper footwear, and using proper lighting in rooms.  Continue follow-ups with gyn, Dr. Culp

## 2025-05-21 NOTE — PROGRESS NOTES
Lucretia Bean is a 64 y.o. female here for a diabetic eye screening with non-dilated fundus photos per Dr. Delgado .    Patient cooperative?: Yes  Small pupils?: Yes  Last eye exam: 10/03/2023    For exam results, see Encounter Report.

## 2025-05-21 NOTE — PROGRESS NOTES
Please inform patient of results.    1. Diabetic eye exam shows no apparent diabetic retinopathy.  Repeat diabetic eye exam with primary care in 12 months.

## 2025-05-21 NOTE — ASSESSMENT & PLAN NOTE
Controlled  Continue Crestor 40 mg by mouth daily  FLP with next lab work   Encouraged low-fat diet/exercise regimen 30 minutes/day   Return to clinic with any concerns.
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
## 2025-05-21 NOTE — PROGRESS NOTES
Health Maintenance Topic(s) Outreach Outcomes & Actions Taken:    Breast Cancer Screening - Outreach Outcomes & Actions Taken  : External Records Requested & Care Team Updated if Applicable    Osteoporosis Screening - Outreach Outcomes & Actions Taken  : External Records Requested, Care Team Updated if Applicable    Cervical Cancer Screening - Outreach Outcomes & Actions Taken  : External Records Requested & Care Team Updated if Applicable     Additional Notes:  Request Mammogram and Dexa Scan: [facility]  Request Pap Smear Report: Dr Culp

## 2025-05-21 NOTE — ASSESSMENT & PLAN NOTE
Controlled  Continue lisinopril-HCTZ 20-12.5 mg by mouth daily  Monitor BP at minimum once per day, keep log, ensure you bring log to every office visit  Discussed importance of low sodium/well balanced diet  Discussed physical activity regimen for at least 30 minutes/day  Return to clinic with any concerns

## 2025-05-21 NOTE — LETTER
AUTHORIZATION FOR RELEASE OF CONFIDENTIAL INFORMATION      2025      Dear RADU,    We are seeing Lucretia Bean, date of birth 1960, in the clinic at [facility].  Herb Delgado MD is the patient's PCP. Lucretia Bean has an outstanding lab/procedure at the time we reviewed his chart.  In order to help keep her health information updated, Lucretia has authorized us to request the following medical record(s):        Mammogram      Dexa Scan           Please fax any records to Herb Delgado MD's at  [phone]    If you have any questions, please contact  Rodney MCCORD CCC @ [phone]             Patient Name: Lucretia Bean  :1960  Patient Phone #:[phone]                Lucretia Bean  MRN: [id number]  : 1960  Age: 64 y.o.  Sex: female         Patient/Legal Guardian Signature  This signature was collected at 02/10/2025           _______________________________   Printed Name/Relationship to Patient      Consent for Examination and Treatment: I hereby authorize the providers and employees of [facility] ([facility]) to provide medical treatment/services which includes, but is not limited to, performing and administering tests and diagnostic procedures that are deemed necessary, including, but not limited to, imaging examinations, blood tests and other laboratory procedures as may be required by the hospital, clinic, or may be ordered by my physician(s) or persons working under the general and/or special instructions of my physician(s).      I understand and agree that this consent covers all authorized persons, including but not limited to physicians, residents, nurse practitioners, physicians' assistants, specialists, consultants, student nurses, and independently contracted physicians, who are called upon by the physician in charge, to carry out the diagnostic procedures and medical or surgical treatment.     I hereby authorize [facility] to retain or  dispose of any specimens or tissue, should there be such remaining from any test or procedure.     I hereby authorize and give consent for [facility] providers and employees to take photographs, images or videotapes of such diagnostic, surgical or treatment procedures of Patient as may be required by [facility] or as may be ordered by a physician. I further acknowledge and agree that [facility] may use cameras or other devices for patient monitoring.     I am aware that the practice of medicine is not an exact science, and I acknowledge that no guarantees have been made to me as to the outcome of any tests, procedures or treatment.     Authorization for Release of Information: I understand that my insurance company and/or their agents may need information necessary to make determinations about payment/reimbursement. I hereby provide authorization to release to all insurance companies, their successors, assignees, other parties with whom they may have contracted, or others acting on their behalf, that are involved with payment for any hospital and/or clinic charges incurred by the patient, any information that they request and deem necessary for payment/reimbursement, and/or quality review.  I further authorize the release of my health information to physicians or other health care practitioners on staff who are involved in my health care now and in the future, and to other health care providers, entities, or institutions for the purpose of my continued care and treatment, including referrals.     REGISTRATION AUTHORIZATION  Form No. 13964 (Rev. 3/25/2024)    Page 1 of 3                       Medicare Patient's Certification and Authorization to Release Information and Payment Request:  I certify that the information given by me in applying for payment under Title XVIII of the Social Security Act is correct. I authorize any andrade of medical or other information about me to release to the Social SecurityAdministration, or its  intermediaries or carriers, any information needed for this or a related Medicare claim. I request that payment of authorized benefits be made on my behalf.     Assignment of Insurance Benefits:   I hereby authorize any and all insurance companies, health plans, defined   benefit plans, health insurers or any entity that is or may be responsible for payment of my medical expenses to pay all hospital and medical benefits now due, and to become due and payable to me under any hospital benefits, sick benefits, injury benefits or any other benefit for services rendered to me, including Major Medical Benefits, direct to [facility] and all independently contracted physicians. I assign any and all rights that I may have against any and all insurance companies, health plans, defined benefit plans, health insurers or any entity that is or may be responsible for payment of my medical expenses, including, but not limited to any right to appeal a denial of a claim, any right to bring any action, lawsuit, administrative proceeding, or other cause of action on my behalf. I specifically assign my right to pursue litigation against any and all insurance companies, health plans, defined benefit plans, health insurers or any entity that is or may be responsible for payment of my medical expenses based upon a refusal to pay charges.            E. Valuables: It is understood and agreed that [facility] is not liable for the damage to or loss of any money, jewelry,   documents, dentures, eye glasses, hearing aids, prosthetics, or other property of value.     F. Computer Equipment: I understand and agree that should I choose to use computer equipment owned by [facility] or if I choose to access the Internet via [facility]s network, I do so at my own risk. [facility] is not responsible for any damage to my computer equipment or to any damages of any type that might arise from my loss of equipment or data.     G. Acceptance of Financial Responsibility:   I agree that in consideration of the services and   supplies that have been   or will be furnished to the patient, I am hereby obligated to pay all charges made for or on the account of the patient according to the standard rates (in effect at the time the services and supplies are delivered) established by [facility], including its Patient Financial Assistance Policy to the extent it is applicable. I understand that I am responsible for all charges, or portions thereof, not covered by insurance or other sources. Patient refunds will be distributed only after balances at all [facility] facilities are paid.     H. Communication Authorization:  I hereby authorize [facility] and its representatives, along with any billing service   or  who may work on their behalf, to contact me on   my cell phone and/or home phone using pre- recorded messages, artificial voice messages, automatic telephone dialing devices or other computer assisted technology, or by electronic      mail, text messaging, or by any other form of electronic communication. This includes, but is not limited to, appointment reminders, yearly physical exam reminders, preventive care reminders, patient campaigns, welcome calls, and calls about account balances on my account or any account on which I am listed as a guarantor. I understand I have the right to opt out of these communications at any time.      Relationship  Between  Facility and  Provider:      I understand that some, but not all, providers furnishing services to the patient are not employees or agents of [facility]. The patient is under the care and supervision of his/her attending physician, and it is the responsibility of the facility and its nursing staff to carry out the instructions of such physicians. It is the responsibility of the patient's physician/designee to obtain the patient's informed consent, when required, for medical or surgical treatment, special diagnostic or therapeutic  procedures, or hospital services rendered for the patient under the special instructions of the physician/designee.           REGISTRATION AUTHORIZATION  Form No. 81728 (Rev. 3/25/2024)    Page 2 of 3                       Immunizations: [facility] shares immunization information with state sponsored health departments to help you and your doctor keep track of your immunization records. By signing, you consent to have this information shared with the health department in your state:                                [place] - LINKS ([place] Immunization Network for Kids Statewide)                                [place] - MIIX ([place] Immunization Information eXchange)                                [place] - ImmPRINT (Immunization Patient Registry with Integrated Technology)     TERM: This authorization is valid for this and subsequent care/treatment I receive at [facility] and will remain valid unless/until revoked in writing by me.     [facility]: As used in this document, [facility] means all [facility] owned and managed facilities, including, but not limited to, all health centers, surgery centers, clinics, urgent care centers, and hospitals.         [facility] complies with applicable Federal civil rights laws and does not discriminate on the basis of race, color, national origin, age, disability, or sex.  ATENCIÓN: si habla randimurray, tiene a hollis disposición servicios gratuitos de asistencia lingüística. Steven al [phone].  CHÚ Ý: N?u b?n nói Ti?ng Vi?t, có các d?ch v? h? tr? ngôn ng? mi?n phí dành cho b?n. G?i s? [phone].        REGISTRATION AUTHORIZATION  Form No. 60290 (Rev. 3/25/2024)   Page 3 of 3

## 2025-07-28 ENCOUNTER — PATIENT MESSAGE (OUTPATIENT)
Dept: NEUROLOGY | Facility: CLINIC | Age: 65
End: 2025-07-28
Payer: MEDICARE

## 2025-08-12 ENCOUNTER — OFFICE VISIT (OUTPATIENT)
Dept: NEUROLOGY | Facility: CLINIC | Age: 65
End: 2025-08-12
Payer: MEDICARE

## 2025-08-12 VITALS
SYSTOLIC BLOOD PRESSURE: 108 MMHG | BODY MASS INDEX: 19.67 KG/M2 | DIASTOLIC BLOOD PRESSURE: 64 MMHG | WEIGHT: 85 LBS | HEIGHT: 55 IN

## 2025-08-12 DIAGNOSIS — F09 COGNITIVE DYSFUNCTION: ICD-10-CM

## 2025-08-12 DIAGNOSIS — G47.33 OSA ON CPAP: Primary | ICD-10-CM

## 2025-08-12 DIAGNOSIS — R41.3 MEMORY LOSS: ICD-10-CM

## 2025-08-12 PROCEDURE — 99213 OFFICE O/P EST LOW 20 MIN: CPT | Mod: S$PBB,,, | Performed by: NURSE PRACTITIONER

## 2025-08-12 PROCEDURE — 99999 PR PBB SHADOW E&M-EST. PATIENT-LVL III: CPT | Mod: PBBFAC,,, | Performed by: NURSE PRACTITIONER

## 2025-08-12 PROCEDURE — 99213 OFFICE O/P EST LOW 20 MIN: CPT | Mod: PBBFAC | Performed by: NURSE PRACTITIONER

## 2025-08-23 ENCOUNTER — PATIENT MESSAGE (OUTPATIENT)
Dept: RESEARCH | Facility: HOSPITAL | Age: 65
End: 2025-08-23
Payer: MEDICARE